# Patient Record
Sex: FEMALE | Race: WHITE | NOT HISPANIC OR LATINO | Employment: FULL TIME | URBAN - METROPOLITAN AREA
[De-identification: names, ages, dates, MRNs, and addresses within clinical notes are randomized per-mention and may not be internally consistent; named-entity substitution may affect disease eponyms.]

---

## 2018-05-07 ENCOUNTER — TELEPHONE (OUTPATIENT)
Dept: FAMILY MEDICINE | Facility: CLINIC | Age: 43
End: 2018-05-07

## 2018-05-07 NOTE — TELEPHONE ENCOUNTER
----- Message from Palmer Ferraro MD sent at 5/4/2018 12:38 PM CDT -----  Check if patient needs  followup office visit after emergency room or for annual physical  ----- Message -----  From: Nora Givens MA  Sent: 5/2/2018  10:27 AM  To: Palmer Ferraro MD        ----- Message -----  From: Bruna Clark  Sent: 5/2/2018  10:13 AM  To: Jasmine Chakraborty Staff    Emergency room, 05/02/2018

## 2019-01-10 ENCOUNTER — TELEPHONE (OUTPATIENT)
Dept: UROLOGY | Facility: CLINIC | Age: 44
End: 2019-01-10

## 2019-02-12 ENCOUNTER — OFFICE VISIT (OUTPATIENT)
Dept: UROLOGY | Facility: CLINIC | Age: 44
End: 2019-02-12
Payer: COMMERCIAL

## 2019-02-12 VITALS
WEIGHT: 177.94 LBS | HEIGHT: 62 IN | SYSTOLIC BLOOD PRESSURE: 121 MMHG | HEART RATE: 83 BPM | DIASTOLIC BLOOD PRESSURE: 82 MMHG | BODY MASS INDEX: 32.74 KG/M2

## 2019-02-12 DIAGNOSIS — N20.0 NEPHROLITHIASIS: ICD-10-CM

## 2019-02-12 DIAGNOSIS — M54.9 BACK PAIN, UNSPECIFIED BACK LOCATION, UNSPECIFIED BACK PAIN LATERALITY, UNSPECIFIED CHRONICITY: ICD-10-CM

## 2019-02-12 DIAGNOSIS — N39.3 SUI (STRESS URINARY INCONTINENCE, FEMALE): Primary | ICD-10-CM

## 2019-02-12 DIAGNOSIS — R35.0 URINARY FREQUENCY: ICD-10-CM

## 2019-02-12 LAB
BILIRUB SERPL-MCNC: ABNORMAL MG/DL
BLOOD URINE, POC: ABNORMAL
COLOR, POC UA: ABNORMAL
GLUCOSE UR QL STRIP: ABNORMAL
KETONES UR QL STRIP: ABNORMAL
LEUKOCYTE ESTERASE URINE, POC: ABNORMAL
NITRITE, POC UA: ABNORMAL
PH, POC UA: 5.5
PROTEIN, POC: ABNORMAL
SPECIFIC GRAVITY, POC UA: 1.02
UROBILINOGEN, POC UA: 0.2

## 2019-02-12 PROCEDURE — 99205 PR OFFICE/OUTPT VISIT, NEW, LEVL V, 60-74 MIN: ICD-10-PCS | Mod: 25,S$GLB,, | Performed by: UROLOGY

## 2019-02-12 PROCEDURE — 99999 PR PBB SHADOW E&M-EST. PATIENT-LVL III: CPT | Mod: PBBFAC,,, | Performed by: UROLOGY

## 2019-02-12 PROCEDURE — 3008F PR BODY MASS INDEX (BMI) DOCUMENTED: ICD-10-PCS | Mod: CPTII,S$GLB,, | Performed by: UROLOGY

## 2019-02-12 PROCEDURE — 81002 POCT URINE DIPSTICK WITHOUT MICROSCOPE: ICD-10-PCS | Mod: S$GLB,,, | Performed by: UROLOGY

## 2019-02-12 PROCEDURE — 99205 OFFICE O/P NEW HI 60 MIN: CPT | Mod: 25,S$GLB,, | Performed by: UROLOGY

## 2019-02-12 PROCEDURE — 3008F BODY MASS INDEX DOCD: CPT | Mod: CPTII,S$GLB,, | Performed by: UROLOGY

## 2019-02-12 PROCEDURE — 81002 URINALYSIS NONAUTO W/O SCOPE: CPT | Mod: S$GLB,,, | Performed by: UROLOGY

## 2019-02-12 PROCEDURE — 51725 SIMPLE CYSTOMETROGRAM: CPT | Mod: S$GLB,,, | Performed by: UROLOGY

## 2019-02-12 PROCEDURE — 51725 PR SIMPLE CYSTOMETROGRAM: ICD-10-PCS | Mod: S$GLB,,, | Performed by: UROLOGY

## 2019-02-12 PROCEDURE — 99999 PR PBB SHADOW E&M-EST. PATIENT-LVL III: ICD-10-PCS | Mod: PBBFAC,,, | Performed by: UROLOGY

## 2019-02-12 RX ORDER — IBUPROFEN 600 MG/1
1 TABLET ORAL
COMMUNITY
Start: 2019-01-20 | End: 2022-04-04

## 2019-02-12 RX ORDER — ESCITALOPRAM OXALATE 20 MG/1
1 TABLET ORAL NIGHTLY
COMMUNITY
Start: 2013-09-23 | End: 2022-04-04 | Stop reason: SDUPTHER

## 2019-02-12 NOTE — LETTER
February 12, 2019      Mohinder Gupta MD  [address] [facility]  [address]  [place] [address]           [place] [facility]  [address] [place] [address] [address]  [place] [address]  Phone: [phone]  Fax: [phone]          Patient: Liat Mensah   MR Number: [id number]   YOB: 1975   Date of Visit: 2/12/2019       Dear Dr. Mohinder Gupta:    Thank you for referring Liat Mensah to me for evaluation. Attached you will find relevant portions of my assessment and plan of care.    If you have questions, please do not hesitate to call me. I look forward to following Liat Mensah along with you.    Sincerely,    Liya Barker MD    Enclosure  CC:  No Recipients    If you would like to receive this communication electronically, please contact [email or web address] or [phone] to request more information on [employer] access.    For providers and/or their staff who would like to refer a patient to [facility], please contact us through our one-stop-shop provider referral line, [facility], at [phone].    If you feel you have received this communication in error or would no longer like to receive these types of communications, please e-mail [email or web address]

## 2019-02-12 NOTE — PATIENT INSTRUCTIONS
Sagrario  -went over risks and benefits. Pt wants to hold off for now. Nervous. Given info today. 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 +stress test with standing only with 150cc. Large volume leakage. Can try kegels 10 in a row 2x a day and keeping bladder empty for now. When bothersome enough return. Discussed other options but at her age would recommend sling. No prolapse seen.    nephrolithiasis  -previously had a left uvj stone, no other stones. Hold off on workup    Back pain  -referral to      She will call for f/u once she decides to proceed

## 2019-02-12 NOTE — PROGRESS NOTES
[facility] Note - [place]  Staff: MD Mj    Referring provider and please cc: Dr.Chequita Gupta  PCP: Dr.Chequita Gupta    [facility]: active - will sign up today     Chief Complaint:     Subjective:        HPI: Liat Mensah is a 43 y.o. female presents with     ETHEL, female  She has primary ETHEL that has been present since the birth of her last child 16 years ago and has been worsening since. Very bothersome to her. Changes pantyliners at least 3x a day and voids 30 minutes to 1 hour to keep bladder empty. Significant leakage ETHEL with sneezing, coughing. Denies any prolapse. . Has tried kegels for a long time without success. Not prone to uti's. 8 pound babies  Does not plan to have any more children. Still has regular menses    Right kidney stone  She has a h/o kidney stone in 2018, went to [facility] for left flank pain. Passed this stone and was told she had another stone. This was her first stone. ctrss showed a 3x2mm left uvj stone with mild hydro.     No blood in urine since    ua void: tr leukocytes  Urine history:  18 Ng, void: 3+ blood (time of stone)    ECOG Status: 0      REVIEW OF SYSTEMS:  General ROS: no fevers, no chills  Psychological ROS: no depression  Endocrine ROS: no heat or cold  Respiratory ROS: no SOB  Cardiovascular ROS: no CP  Gastrointestinal ROS: no abdominal pain, no constipation, no diarrhea, noBRBPR  Musculoskeletal ROS: no muscle pain  Neurological ROS: no headaches  Dermatological ROS: no rashes  HEENT: noglasses, no sinus   ROS: per HPI     PMHx:  Past Medical History:   Diagnosis Date    Back pain     Panic attack    ethel, female  Panic disorder - trauma  Kidney stones: Yes     PSHx:  Past Surgical History:   Procedure Laterality Date    breast augmentation      DILATION AND CURETTAGE OF UTERUS      TUBAL LIGATION     breast reduction  Urologic or Gynecologic Surgery: tubal ligation and d&c     Stents/Valves/Foreign Bodies:  none  Cardiologist: none  Gynecologist: , last pap 3 years ago, normal.     Fam Hx:   malignancies: No , gyn malignancies: No .  Mother  a 63, CHF? Didn't know father  kidney stones: No     Soc Hx:  + tobacco.  1 pk per day x 25 year  No alcohol  Lives in [place]  : here with    Children: 2   Occupation:  at [employer]    Allergies:  Patient has no known allergies.    Home Medications: reviewed   Urologic Medications:   Anticoagulation: Yes - BC powders for chronic back pain, hasn't seen anyone     Objective:     Vitals:    19 1001   BP: 121/82   Pulse: 83       General:WDWN in NAD  Eyes: PERRLA, normal conjunctiva  Respiratory: no increased work on breathing. No wheezing.   Cardiovascular: No obvious extremity edema. Warm and well perfused.   GI: no palpation of masses. No tenderness. No hepatosplenomegaly to palpation.  Musculoskeletal: normal range of motion of bilateral upper extremities. Normal muscle strength and tone.  Skin: no obvious rashes or lesions. No tightening of skin noted.  Neurologic: CN grossly normal. Normal sensation.   Psychiatric: awake, alert and oriented x 3. Mood and affect normal. Cooperative.    Pelvic exam  External Genitalia: normal hair distribution, no lesions  Urethral meatus: normal without prolapse or caruncle  Urethra: without tenderness or mass  Bladder: without fulness or tenderness  Vagina: normal appearing. No discharge or lesions. no prolapse.  Anus and perineum: appear normal  In and out cath performed with 20cc residual  Levator ani tenderness: none    Pelvic exam today:  neg stress test with coughing supine, neg stress test with valsalva supine  In and out cath performed with 20cc residual - urine was not sent for sample  First sensation to void felt at 40 cc  Significant urge felt at 100cc  Bladder filled to 120 cc  Catheter removed  Neg stress test with coughing supine, neg stress test with valsalva supine  Refilled  to 150cc total  Catheter removed  Neg stress test with coughing supine, neg stress test with valsalva supine  +stress test with coughing. Neg  valsalva  Standing    No prolapse noted    LABS REVIEW:      No results found for: WBC, HGB, HCT, MCV, PLT  BMP  No results found for: NA, K, CL, CO2, BUN, CREATININE, CALCIUM, ANIONGAP, ESTGFRAFRICA, EGFRNONAA      PATHOLOGY REVIEW:  none    RADIOGRAPHIC REVIEW:  ctrss 5/2/18 smh  Left uvj stone 3x2mm, no other stones in kidney      Assessment:       1. GERMAN (stress urinary incontinence, female)    2. Back pain, unspecified back location, unspecified back pain laterality, unspecified chronicity    3. Nephrolithiasis    4. Urinary frequency          Plan:     German  -went over risks and benefits. Pt wants to hold off for now. Nervous. Given info today. 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 +stress test with standing only with 150cc. Large volume leakage. Can try kegels 10 in a row 2x a day and keeping bladder empty for now. When bothersome enough return. Discussed other options but at her age would recommend sling. No prolapse seen.    nephrolithiasis  -previously had a left uvj stone, no other stones. Hold off on workup    Back pain  -referral to      She will call for f/u once she decides to proceed     Liya Barker MD

## 2022-01-31 ENCOUNTER — TELEPHONE (OUTPATIENT)
Dept: FAMILY MEDICINE | Facility: CLINIC | Age: 47
End: 2022-01-31
Payer: COMMERCIAL

## 2022-01-31 NOTE — TELEPHONE ENCOUNTER
----- Message from Taylor Rock sent at 1/31/2022 10:10 AM CST -----  Pt is having back issues and needs to be seen. Pt wants an earlier appt than 4/4 if possible or let her know what she can pacheco for her back.  Please advise. Pt #[phone]

## 2022-04-04 ENCOUNTER — OFFICE VISIT (OUTPATIENT)
Dept: FAMILY MEDICINE | Facility: CLINIC | Age: 47
End: 2022-04-04
Payer: COMMERCIAL

## 2022-04-04 VITALS
WEIGHT: 182 LBS | HEIGHT: 62 IN | HEART RATE: 75 BPM | SYSTOLIC BLOOD PRESSURE: 130 MMHG | BODY MASS INDEX: 33.49 KG/M2 | OXYGEN SATURATION: 99 % | DIASTOLIC BLOOD PRESSURE: 84 MMHG

## 2022-04-04 DIAGNOSIS — N20.0 KIDNEY STONES: ICD-10-CM

## 2022-04-04 DIAGNOSIS — M54.30 SCIATICA, UNSPECIFIED LATERALITY: ICD-10-CM

## 2022-04-04 DIAGNOSIS — Z12.11 COLON CANCER SCREENING: ICD-10-CM

## 2022-04-04 DIAGNOSIS — F41.1 GAD (GENERALIZED ANXIETY DISORDER): Primary | ICD-10-CM

## 2022-04-04 DIAGNOSIS — Z12.31 ENCOUNTER FOR SCREENING MAMMOGRAM FOR MALIGNANT NEOPLASM OF BREAST: ICD-10-CM

## 2022-04-04 DIAGNOSIS — M15.9 PRIMARY OSTEOARTHRITIS INVOLVING MULTIPLE JOINTS: ICD-10-CM

## 2022-04-04 PROBLEM — F34.1 DYSTHYMIA: Status: ACTIVE | Noted: 2022-04-04

## 2022-04-04 PROCEDURE — 3008F PR BODY MASS INDEX (BMI) DOCUMENTED: ICD-10-PCS | Mod: S$GLB,,, | Performed by: FAMILY MEDICINE

## 2022-04-04 PROCEDURE — 99214 PR OFFICE/OUTPT VISIT, EST, LEVL IV, 30-39 MIN: ICD-10-PCS | Mod: S$GLB,,, | Performed by: FAMILY MEDICINE

## 2022-04-04 PROCEDURE — 99214 OFFICE O/P EST MOD 30 MIN: CPT | Mod: S$GLB,,, | Performed by: FAMILY MEDICINE

## 2022-04-04 PROCEDURE — 3008F BODY MASS INDEX DOCD: CPT | Mod: S$GLB,,, | Performed by: FAMILY MEDICINE

## 2022-04-04 PROCEDURE — 1160F PR REVIEW ALL MEDS BY PRESCRIBER/CLIN PHARMACIST DOCUMENTED: ICD-10-PCS | Mod: S$GLB,,, | Performed by: FAMILY MEDICINE

## 2022-04-04 PROCEDURE — 1160F RVW MEDS BY RX/DR IN RCRD: CPT | Mod: S$GLB,,, | Performed by: FAMILY MEDICINE

## 2022-04-04 RX ORDER — ESCITALOPRAM OXALATE 20 MG/1
20 TABLET ORAL NIGHTLY
Qty: 90 TABLET | Refills: 1 | Status: SHIPPED | OUTPATIENT
Start: 2022-04-04 | End: 2022-10-26

## 2022-04-04 RX ORDER — CYCLOBENZAPRINE HCL 5 MG
TABLET ORAL
COMMUNITY
Start: 2022-02-04 | End: 2022-04-04 | Stop reason: SDUPTHER

## 2022-04-04 RX ORDER — CYCLOBENZAPRINE HCL 5 MG
5 TABLET ORAL NIGHTLY
Qty: 90 TABLET | Refills: 1 | Status: SHIPPED | OUTPATIENT
Start: 2022-04-04 | End: 2023-05-08 | Stop reason: SDUPTHER

## 2022-04-04 NOTE — PROGRESS NOTES
SUBJECTIVE:    Patient ID: Liat Mensah is a 46 y.o. female.    Chief Complaint: Regular Check Up, review outside labs, mammogram order, and cologuard order    Patient back pain has been seen by pain management and lumbar xray and MRI noted. She was unable to get MRI due to anxiety. She was given mobic and muscle relaxer with some relief. Declines injections at this time so she did not return . Has transportation issues   She has anxiety disorder and has been managed by psych. Has done well on lexapro  Continues to smoke is down to 1/2 ppd . Has been weaning off .   Has h/o kidney stones and has pain in her right side that she may have one again   Patient reports heavy menses and concentration deficits  Over the past 2 years  Full recent lab panel from Dr. Samayoa 11/21 were visualized and are normal       Past Medical History:   Diagnosis Date    Back pain     Panic attack      Past Surgical History:   Procedure Laterality Date    breast augmentation      DILATION AND CURETTAGE OF UTERUS      TUBAL LIGATION       History reviewed. No pertinent family history.    Marital Status:   Alcohol History:  reports no history of alcohol use.  Tobacco History:  reports that she has been smoking cigarettes. She has been smoking about 1.00 pack per day. She has never used smokeless tobacco.  Drug History:  reports no history of drug use.    Review of patient's allergies indicates:  No Known Allergies    Current Outpatient Medications:     aspirin-caffeine 1,000-65 mg PwPk, Take by mouth 2 (two) times daily as needed., Disp: , Rfl:     cyclobenzaprine (FLEXERIL) 5 MG tablet, Take 1 tablet (5 mg total) by mouth nightly., Disp: 90 tablet, Rfl: 1    EScitalopram oxalate (LEXAPRO) 20 MG tablet, Take 1 tablet (20 mg total) by mouth nightly., Disp: 90 tablet, Rfl: 1    Review of Systems   Constitutional: Negative for activity change, fatigue and unexpected weight change.   HENT: Negative for hearing loss,  "postnasal drip, sinus pressure, sore throat and voice change.    Eyes: Negative for photophobia and visual disturbance.   Respiratory: Negative for cough, shortness of breath and wheezing.    Cardiovascular: Negative for chest pain and palpitations.   Gastrointestinal: Negative for constipation, diarrhea and nausea.   Genitourinary: Negative for difficulty urinating, frequency, hematuria and urgency.   Musculoskeletal: Positive for back pain. Negative for arthralgias.   Skin: Negative for rash.   Neurological: Negative for weakness, light-headedness and headaches.   Hematological: Negative for adenopathy. Does not bruise/bleed easily.   Psychiatric/Behavioral: The patient is nervous/anxious.           Objective:      Vitals:    04/04/22 1539   BP: 130/84   Pulse: 75   SpO2: 99%   Weight: 82.6 kg (182 lb)   Height: 5' 2" (1.575 m)     Physical Exam  Constitutional:       Appearance: Normal appearance.   HENT:      Head: Normocephalic and atraumatic.      Mouth/Throat:      Mouth: Mucous membranes are moist.   Eyes:      Conjunctiva/sclera: Conjunctivae normal.   Cardiovascular:      Rate and Rhythm: Normal rate and regular rhythm.   Pulmonary:      Effort: Pulmonary effort is normal.   Musculoskeletal:         General: No swelling.   Neurological:      General: No focal deficit present.      Mental Status: She is alert and oriented to person, place, and time.   Psychiatric:         Mood and Affect: Mood normal.         Behavior: Behavior is hyperactive.           Assessment:       1. JOHNY (generalized anxiety disorder)    2. Primary osteoarthritis involving multiple joints    3. Sciatica, unspecified laterality    4. Kidney stones    5. Encounter for screening mammogram for malignant neoplasm of breast    6. Colon cancer screening         Plan:       JOHNY (generalized anxiety disorder)  -     EScitalopram oxalate (LEXAPRO) 20 MG tablet; Take 1 tablet (20 mg total) by mouth nightly.  Dispense: 90 tablet; Refill: " 1    Primary osteoarthritis involving multiple joints  Continue p.r.n. ibuprofen    Sciatica, unspecified laterality  -     cyclobenzaprine (FLEXERIL) 5 MG tablet; Take 1 tablet (5 mg total) by mouth nightly.  Dispense: 90 tablet; Refill: 1    Kidney stones    Encounter for screening mammogram for malignant neoplasm of breast  -     Mammo Digital Screening Bilat; Future; Expected date: 04/04/2022    Colon cancer screening  -     Cologuard Screening (Multitarget Stool DNA); Future; Expected date: 04/04/2022      Follow up in about 6 months (around 10/4/2022) for anxiety.

## 2022-04-21 ENCOUNTER — HOSPITAL ENCOUNTER (OUTPATIENT)
Dept: RADIOLOGY | Facility: HOSPITAL | Age: 47
Discharge: HOME | End: 2022-04-21
Attending: FAMILY MEDICINE
Payer: COMMERCIAL

## 2022-04-21 DIAGNOSIS — Z12.31 ENCOUNTER FOR SCREENING MAMMOGRAM FOR MALIGNANT NEOPLASM OF BREAST: ICD-10-CM

## 2022-04-21 PROCEDURE — 77067 SCR MAMMO BI INCL CAD: CPT | Mod: TC,PO

## 2022-04-21 PROCEDURE — 77063 BREAST TOMOSYNTHESIS BI: CPT | Mod: TC,PO

## 2022-04-27 LAB — NONINV COLON CA DNA+OCC BLD SCRN STL QL: NEGATIVE

## 2022-04-28 ENCOUNTER — TELEPHONE (OUTPATIENT)
Dept: FAMILY MEDICINE | Facility: CLINIC | Age: 47
End: 2022-04-28

## 2022-05-02 DIAGNOSIS — M54.50 LOW BACK PAIN: Primary | ICD-10-CM

## 2022-05-17 ENCOUNTER — HOSPITAL ENCOUNTER (OUTPATIENT)
Dept: RADIOLOGY | Facility: HOSPITAL | Age: 47
Discharge: HOME | End: 2022-05-17
Attending: EMERGENCY MEDICINE
Payer: COMMERCIAL

## 2022-05-17 DIAGNOSIS — M54.50 LOW BACK PAIN: ICD-10-CM

## 2022-05-17 PROCEDURE — 72148 MRI LUMBAR SPINE W/O DYE: CPT | Mod: TC,PO

## 2022-05-22 ENCOUNTER — HOSPITAL ENCOUNTER (EMERGENCY)
Facility: HOSPITAL | Age: 47
Discharge: HOME | End: 2022-05-22
Attending: EMERGENCY MEDICINE
Payer: COMMERCIAL

## 2022-05-22 VITALS
OXYGEN SATURATION: 96 % | TEMPERATURE: 101 F | BODY MASS INDEX: 30.73 KG/M2 | HEART RATE: 114 BPM | SYSTOLIC BLOOD PRESSURE: 147 MMHG | RESPIRATION RATE: 18 BRPM | HEIGHT: 64 IN | WEIGHT: 180 LBS | DIASTOLIC BLOOD PRESSURE: 83 MMHG

## 2022-05-22 DIAGNOSIS — R00.0 TACHYCARDIA: ICD-10-CM

## 2022-05-22 DIAGNOSIS — R50.9 FEVER: ICD-10-CM

## 2022-05-22 DIAGNOSIS — U07.1 COVID-19 VIRUS INFECTION: Primary | ICD-10-CM

## 2022-05-22 LAB
ALBUMIN SERPL BCP-MCNC: 4 G/DL (ref 3.5–5.2)
ALP SERPL-CCNC: 63 U/L (ref 55–135)
ALT SERPL W/O P-5'-P-CCNC: 15 U/L (ref 10–44)
ANION GAP SERPL CALC-SCNC: 8 MMOL/L (ref 8–16)
AST SERPL-CCNC: 17 U/L (ref 10–40)
B-HCG UR QL: NEGATIVE
BACTERIA #/AREA URNS HPF: NEGATIVE /HPF
BASOPHILS # BLD AUTO: 0.03 K/UL (ref 0–0.2)
BASOPHILS NFR BLD: 0.4 % (ref 0–1.9)
BILIRUB SERPL-MCNC: 0.5 MG/DL (ref 0.1–1)
BILIRUB UR QL STRIP: NEGATIVE
BUN SERPL-MCNC: 13 MG/DL (ref 6–20)
CALCIUM SERPL-MCNC: 8.8 MG/DL (ref 8.7–10.5)
CHLORIDE SERPL-SCNC: 105 MMOL/L (ref 95–110)
CLARITY UR: CLEAR
CO2 SERPL-SCNC: 22 MMOL/L (ref 23–29)
COLOR UR: COLORLESS
CREAT SERPL-MCNC: 0.7 MG/DL (ref 0.5–1.4)
CTP QC/QA: YES
D DIMER PPP IA.FEU-MCNC: 0.48 UG/ML FEU
DIFFERENTIAL METHOD: ABNORMAL
EOSINOPHIL # BLD AUTO: 0.1 K/UL (ref 0–0.5)
EOSINOPHIL NFR BLD: 1.1 % (ref 0–8)
ERYTHROCYTE [DISTWIDTH] IN BLOOD BY AUTOMATED COUNT: 13.6 % (ref 11.5–14.5)
EST. GFR  (AFRICAN AMERICAN): >60 ML/MIN/1.73 M^2
EST. GFR  (NON AFRICAN AMERICAN): >60 ML/MIN/1.73 M^2
GLUCOSE SERPL-MCNC: 112 MG/DL (ref 70–110)
GLUCOSE UR QL STRIP: NEGATIVE
HCG INTACT+B SERPL-ACNC: <0.6 MIU/ML
HCT VFR BLD AUTO: 38.7 % (ref 37–48.5)
HGB BLD-MCNC: 12.8 G/DL (ref 12–16)
HGB UR QL STRIP: NEGATIVE
HYALINE CASTS #/AREA URNS LPF: 4 /LPF
IMM GRANULOCYTES # BLD AUTO: 0.03 K/UL (ref 0–0.04)
IMM GRANULOCYTES NFR BLD AUTO: 0.4 % (ref 0–0.5)
INFLUENZA A, MOLECULAR: NEGATIVE
INFLUENZA B, MOLECULAR: NEGATIVE
KETONES UR QL STRIP: NEGATIVE
LEUKOCYTE ESTERASE UR QL STRIP: ABNORMAL
LYMPHOCYTES # BLD AUTO: 0.4 K/UL (ref 1–4.8)
LYMPHOCYTES NFR BLD: 5.6 % (ref 18–48)
MCH RBC QN AUTO: 28.8 PG (ref 27–31)
MCHC RBC AUTO-ENTMCNC: 33.1 G/DL (ref 32–36)
MCV RBC AUTO: 87 FL (ref 82–98)
MICROSCOPIC COMMENT: ABNORMAL
MONOCYTES # BLD AUTO: 0.6 K/UL (ref 0.3–1)
MONOCYTES NFR BLD: 7.8 % (ref 4–15)
NEUTROPHILS # BLD AUTO: 6 K/UL (ref 1.8–7.7)
NEUTROPHILS NFR BLD: 84.7 % (ref 38–73)
NITRITE UR QL STRIP: NEGATIVE
NRBC BLD-RTO: 0 /100 WBC
PH UR STRIP: 6 [PH] (ref 5–8)
PLATELET # BLD AUTO: 190 K/UL (ref 150–450)
PMV BLD AUTO: 10 FL (ref 9.2–12.9)
POTASSIUM SERPL-SCNC: 3.6 MMOL/L (ref 3.5–5.1)
PROT SERPL-MCNC: 7.1 G/DL (ref 6–8.4)
PROT UR QL STRIP: NEGATIVE
RBC # BLD AUTO: 4.45 M/UL (ref 4–5.4)
RBC #/AREA URNS HPF: 1 /HPF (ref 0–4)
SARS-COV-2 RDRP RESP QL NAA+PROBE: POSITIVE
SODIUM SERPL-SCNC: 135 MMOL/L (ref 136–145)
SP GR UR STRIP: 1 (ref 1–1.03)
SPECIMEN SOURCE: NORMAL
SQUAMOUS #/AREA URNS HPF: 4 /HPF
TSH SERPL DL<=0.005 MIU/L-ACNC: 0.84 UIU/ML (ref 0.34–5.6)
URN SPEC COLLECT METH UR: ABNORMAL
UROBILINOGEN UR STRIP-ACNC: NEGATIVE EU/DL
WBC # BLD AUTO: 7.09 K/UL (ref 3.9–12.7)
WBC #/AREA URNS HPF: 7 /HPF (ref 0–5)

## 2022-05-22 PROCEDURE — 63600175 PHARM REV CODE 636 W HCPCS: Performed by: STUDENT IN AN ORGANIZED HEALTH CARE EDUCATION/TRAINING PROGRAM

## 2022-05-22 PROCEDURE — 84702 CHORIONIC GONADOTROPIN TEST: CPT | Performed by: STUDENT IN AN ORGANIZED HEALTH CARE EDUCATION/TRAINING PROGRAM

## 2022-05-22 PROCEDURE — 87502 INFLUENZA DNA AMP PROBE: CPT | Performed by: STUDENT IN AN ORGANIZED HEALTH CARE EDUCATION/TRAINING PROGRAM

## 2022-05-22 PROCEDURE — 96374 THER/PROPH/DIAG INJ IV PUSH: CPT

## 2022-05-22 PROCEDURE — 80053 COMPREHEN METABOLIC PANEL: CPT | Performed by: STUDENT IN AN ORGANIZED HEALTH CARE EDUCATION/TRAINING PROGRAM

## 2022-05-22 PROCEDURE — 96375 TX/PRO/DX INJ NEW DRUG ADDON: CPT

## 2022-05-22 PROCEDURE — U0002 COVID-19 LAB TEST NON-CDC: HCPCS | Performed by: STUDENT IN AN ORGANIZED HEALTH CARE EDUCATION/TRAINING PROGRAM

## 2022-05-22 PROCEDURE — 85379 FIBRIN DEGRADATION QUANT: CPT | Performed by: STUDENT IN AN ORGANIZED HEALTH CARE EDUCATION/TRAINING PROGRAM

## 2022-05-22 PROCEDURE — 96361 HYDRATE IV INFUSION ADD-ON: CPT

## 2022-05-22 PROCEDURE — 81001 URINALYSIS AUTO W/SCOPE: CPT | Performed by: STUDENT IN AN ORGANIZED HEALTH CARE EDUCATION/TRAINING PROGRAM

## 2022-05-22 PROCEDURE — 93005 ELECTROCARDIOGRAM TRACING: CPT | Performed by: INTERNAL MEDICINE

## 2022-05-22 PROCEDURE — 85025 COMPLETE CBC W/AUTO DIFF WBC: CPT | Performed by: STUDENT IN AN ORGANIZED HEALTH CARE EDUCATION/TRAINING PROGRAM

## 2022-05-22 PROCEDURE — 81025 URINE PREGNANCY TEST: CPT | Performed by: EMERGENCY MEDICINE

## 2022-05-22 PROCEDURE — 93010 EKG 12-LEAD: ICD-10-PCS | Mod: ,,, | Performed by: INTERNAL MEDICINE

## 2022-05-22 PROCEDURE — 93010 ELECTROCARDIOGRAM REPORT: CPT | Mod: ,,, | Performed by: INTERNAL MEDICINE

## 2022-05-22 PROCEDURE — 25000003 PHARM REV CODE 250: Performed by: STUDENT IN AN ORGANIZED HEALTH CARE EDUCATION/TRAINING PROGRAM

## 2022-05-22 PROCEDURE — 99284 EMERGENCY DEPT VISIT MOD MDM: CPT | Mod: 25

## 2022-05-22 PROCEDURE — 84443 ASSAY THYROID STIM HORMONE: CPT | Performed by: STUDENT IN AN ORGANIZED HEALTH CARE EDUCATION/TRAINING PROGRAM

## 2022-05-22 RX ORDER — PROCHLORPERAZINE EDISYLATE 5 MG/ML
10 INJECTION INTRAMUSCULAR; INTRAVENOUS
Status: COMPLETED | OUTPATIENT
Start: 2022-05-22 | End: 2022-05-22

## 2022-05-22 RX ORDER — DIPHENHYDRAMINE HYDROCHLORIDE 50 MG/ML
12.5 INJECTION INTRAMUSCULAR; INTRAVENOUS
Status: COMPLETED | OUTPATIENT
Start: 2022-05-22 | End: 2022-05-22

## 2022-05-22 RX ORDER — ACETAMINOPHEN 500 MG
1000 TABLET ORAL
Status: COMPLETED | OUTPATIENT
Start: 2022-05-22 | End: 2022-05-22

## 2022-05-22 RX ADMIN — SODIUM CHLORIDE 1000 ML: 0.9 INJECTION, SOLUTION INTRAVENOUS at 04:05

## 2022-05-22 RX ADMIN — PROCHLORPERAZINE EDISYLATE 10 MG: 5 INJECTION INTRAMUSCULAR; INTRAVENOUS at 04:05

## 2022-05-22 RX ADMIN — DIPHENHYDRAMINE HYDROCHLORIDE 12.5 MG: 50 INJECTION INTRAMUSCULAR; INTRAVENOUS at 04:05

## 2022-05-22 RX ADMIN — ACETAMINOPHEN 1000 MG: 500 TABLET, FILM COATED ORAL at 04:05

## 2022-05-22 NOTE — ED NOTES
PT C/O LOWER BACK PAIN RADIATING DOWN BOTH LEGS AND HEADACHE/JAW PAIN. NO ACUTE DISTRESS NOTED. STABLE CONDITION. FAMILY MEMBER AT BEDSIDE.

## 2022-05-22 NOTE — ED PROVIDER NOTES
Encounter Date: 5/22/2022       History     Chief Complaint   Patient presents with    Jaw Pain     Headache ache, lower back pain and legs pain     47-year-old female presents to the ER for lower back pain and bilateral leg pain as well as headache and jaw pain.  Patient states she was in her usual state of health till Saturday afternoon.  She had woken from a nap around 3:00 p.m. and had chills she stated that she could not get warm she had to take several warm baths.  She then developed bilateral jaw pain wrapping around to her temples and into her frontal sinus.  Her pain did not improve with BC powders and her nightly muscle relaxer so she was brought to the ER for evaluation.  She denies any urinary complaints vaginal discharge cough shortness of breath or abdominal pain.        Review of patient's allergies indicates:  No Known Allergies  Past Medical History:   Diagnosis Date    Back pain     Panic attack      Past Surgical History:   Procedure Laterality Date    breast augmentation      DILATION AND CURETTAGE OF UTERUS      TUBAL LIGATION       No family history on file.  Social History     Tobacco Use    Smoking status: Current Every Day Smoker     Packs/day: 1.00     Types: Cigarettes    Smokeless tobacco: Never Used   Substance Use Topics    Alcohol use: No    Drug use: No     Review of Systems   Constitutional: Positive for chills and fever.   HENT: Positive for sinus pain. Negative for mouth sores, sore throat and trouble swallowing.    Eyes: Negative for redness.   Respiratory: Negative for cough and shortness of breath.    Cardiovascular: Negative for chest pain and palpitations.   Gastrointestinal: Negative for abdominal pain, nausea and vomiting.   Endocrine: Negative for polyuria.   Genitourinary: Negative for dysuria, flank pain, frequency and vaginal discharge.   Musculoskeletal: Positive for back pain. Negative for gait problem and neck pain.   Skin: Negative for rash.   Neurological:  Positive for headaches. Negative for numbness.       Physical Exam     Initial Vitals [05/22/22 0325]   BP Pulse Resp Temp SpO2   (!) 170/93 (!) 120 18 (!) 100.8 °F (38.2 °C) 100 %      MAP       --         Physical Exam    Nursing note and vitals reviewed.  Constitutional: She appears well-developed and well-nourished.   HENT:   Head: Normocephalic and atraumatic.   Mouth/Throat: Oropharynx is clear and moist.   Eyes: EOM are normal. Pupils are equal, round, and reactive to light. Right eye exhibits no discharge. Left eye exhibits no discharge.   Neck: Neck supple. No tracheal deviation present. No JVD present.   Normal range of motion.  Cardiovascular: Normal rate, regular rhythm, normal heart sounds and intact distal pulses.   Pulmonary/Chest: Breath sounds normal. No respiratory distress. She has no wheezes.   Abdominal: Abdomen is soft. Bowel sounds are normal. She exhibits no distension. There is no abdominal tenderness.   Musculoskeletal:         General: No edema. Normal range of motion.      Cervical back: Normal range of motion and neck supple.      Lumbar back: Tenderness present. No bony tenderness.      Comments: Tenderness to palpation to bilateral lumbar paraspinal.     Neurological: She is alert and oriented to person, place, and time. No cranial nerve deficit or sensory deficit. GCS score is 15. GCS eye subscore is 4. GCS verbal subscore is 5. GCS motor subscore is 6.   Skin: Skin is warm.              ED Course   Procedures  Labs Reviewed   CBC W/ AUTO DIFFERENTIAL - Abnormal; Notable for the following components:       Result Value    Lymph # 0.4 (*)     Gran % 84.7 (*)     Lymph % 5.6 (*)     All other components within normal limits    Narrative:     Release to patient->Immediate   COMPREHENSIVE METABOLIC PANEL - Abnormal; Notable for the following components:    Sodium 135 (*)     CO2 22 (*)     Glucose 112 (*)     All other components within normal limits    Narrative:     Release to  patient->Immediate   URINALYSIS, REFLEX TO URINE CULTURE - Abnormal; Notable for the following components:    Color, UA Colorless (*)     Leukocytes, UA 1+ (*)     All other components within normal limits    Narrative:     Specimen Source->Urine   SARS-COV-2 RNA AMPLIFICATION, QUAL - Abnormal; Notable for the following components:    SARS-CoV-2 RNA, Amplification, Qual Positive (*)     All other components within normal limits    Narrative:     SCOV[facility]  result(s) called and verbal readback obtained from [facility]  by FF1 05/22/2022 05:23   URINALYSIS MICROSCOPIC - Abnormal; Notable for the following components:    WBC, UA 7 (*)     Hyaline Casts, UA 4 (*)     All other components within normal limits    Narrative:     Specimen Source->Urine   HCG, QUANTITATIVE    Narrative:     Release to patient->Immediate   INFLUENZA A AND B ANTIGEN    Narrative:     Specimen Source->Nasopharyngeal Swab   TSH    Narrative:     Release to patient->Immediate   D DIMER, QUANTITATIVE    Narrative:     Release to patient->Immediate   POCT URINE PREGNANCY        ECG Results          EKG 12-lead (In process)  Result time 05/22/22 05:36:16    In process by Interface, Lab In [facility] (05/22/22 05:36:16)                 Narrative:    Test Reason : R00.0,    Vent. Rate : 113 BPM     Atrial Rate : 113 BPM     P-R Int : 144 ms          QRS Dur : 090 ms      QT Int : 342 ms       P-R-T Axes : 020 042 018 degrees     QTc Int : 469 ms    Sinus tachycardia  Nonspecific T wave abnormality  Abnormal ECG  No previous ECGs available    Referred By: [email or web address]   SELF           Confirmed By:                             Imaging Results          X-Ray Chest PA And Lateral (In process)                  Medications   sodium chloride 0.9% bolus 1,000 mL (0 mLs Intravenous Stopped 5/22/22 0140)   prochlorperazine injection Soln 10 mg (10 mg Intravenous Given 5/22/22 3886)   acetaminophen tablet 1,000 mg (1,000 mg Oral Given 5/22/22 1752)  "  diphenhydrAMINE injection 12.5 mg (12.5 mg Intravenous Given 5/22/22 3650)     Medical Decision Making:   Initial Assessment:   47-year-old female presents to the ER for fevers and chills of unknown origin.  She also has acute on chronic low back pain.  She had an MRI done this month which showed disc and facet disease as well as facet narrowing at L5-S1.  Did not show any evidence cord impingement.  Her abdomen is soft she does have bilateral lower back tenderness to palpation.  No saddle anesthesia no numbness no weakness.  Shows no CVA tenderness.  Cardiovascular exam is unremarkable neurological exam is unremarkable HEENT exam is normal.  ED Management:  PGY4 MDM:   47-year-old female presenting the ER for fevers and chills.  She also was complaining of chronic low back pain.  She had no midline tenderness no saddle anesthesia no numbness no weakness to her lower extremities.  No history of any IV drug use or steroid use.  Also no history of any instrumentation.  She also got an MRI done this month which did not show any evidence of any cord compression.  Given her lack of any trauma or risk factors for CNS infection she is below the threshold for any imaging of her back at this time.  She has no abdominal pain on my examination.  Her chest x-ray is normal urinalysis is non infectious nor does she have any complaints of any dysuria, urinary frequency.  Will not empirically and treated for UTI.  She is flu negative but COVID-19 positive.  Her D-dimer is negative low suspicion for any venous thromboembolism.  He was given discharge instructions as well as return precautions.  She is instructed to maintain hydration alternate Tylenol Motrin for fever.  She verbalized understanding.  She is stable for discharge at this time     Angel De La Torre MD PGY4  "5/22/2022" 0537                Attending Attestation:   Physician Attestation Statement for Resident:  As the supervising MD   Physician Attestation Statement: I " have personally seen and examined this patient.   I agree with the above history. -:   As the supervising MD I agree with the above PE.    As the supervising MD I agree with the above treatment, course, plan, and disposition.                         Clinical Impression:   Final diagnoses:  [R50.9] Fever  [R00.0] Tachycardia  [U07.1] COVID-19 virus infection (Primary)          ED Disposition Condition    Discharge Stable        ED Prescriptions     None        Follow-up Information     Follow up With Specialties Details Why Contact Info    Mohinder Gupta MD Family Medicine Call in 1 day For follow-up [address]  [address]  [facility] [address]  [phone]             Angel De La Torre MD  Resident  05/22/22 0537       Ivan Greene MD  05/22/22 0557

## 2022-10-26 ENCOUNTER — OFFICE VISIT (OUTPATIENT)
Dept: FAMILY MEDICINE | Facility: CLINIC | Age: 47
End: 2022-10-26
Payer: COMMERCIAL

## 2022-10-26 VITALS
SYSTOLIC BLOOD PRESSURE: 120 MMHG | DIASTOLIC BLOOD PRESSURE: 74 MMHG | HEIGHT: 64 IN | HEART RATE: 87 BPM | WEIGHT: 180.81 LBS | OXYGEN SATURATION: 98 % | BODY MASS INDEX: 30.87 KG/M2

## 2022-10-26 DIAGNOSIS — Z71.3 NUTRITIONAL COUNSELING: ICD-10-CM

## 2022-10-26 DIAGNOSIS — G47.9 SLEEP DISORDER: ICD-10-CM

## 2022-10-26 DIAGNOSIS — R35.1 NOCTURIA: ICD-10-CM

## 2022-10-26 DIAGNOSIS — F41.1 GAD (GENERALIZED ANXIETY DISORDER): Primary | ICD-10-CM

## 2022-10-26 DIAGNOSIS — R68.82 DECREASED LIBIDO: ICD-10-CM

## 2022-10-26 PROCEDURE — 3074F SYST BP LT 130 MM HG: CPT | Mod: CPTII,S$GLB,, | Performed by: FAMILY MEDICINE

## 2022-10-26 PROCEDURE — 3074F PR MOST RECENT SYSTOLIC BLOOD PRESSURE < 130 MM HG: ICD-10-PCS | Mod: CPTII,S$GLB,, | Performed by: FAMILY MEDICINE

## 2022-10-26 PROCEDURE — 1159F PR MEDICATION LIST DOCUMENTED IN MEDICAL RECORD: ICD-10-PCS | Mod: CPTII,S$GLB,, | Performed by: FAMILY MEDICINE

## 2022-10-26 PROCEDURE — 99214 OFFICE O/P EST MOD 30 MIN: CPT | Mod: S$GLB,,, | Performed by: FAMILY MEDICINE

## 2022-10-26 PROCEDURE — 3078F PR MOST RECENT DIASTOLIC BLOOD PRESSURE < 80 MM HG: ICD-10-PCS | Mod: CPTII,S$GLB,, | Performed by: FAMILY MEDICINE

## 2022-10-26 PROCEDURE — 3078F DIAST BP <80 MM HG: CPT | Mod: CPTII,S$GLB,, | Performed by: FAMILY MEDICINE

## 2022-10-26 PROCEDURE — 99214 PR OFFICE/OUTPT VISIT, EST, LEVL IV, 30-39 MIN: ICD-10-PCS | Mod: S$GLB,,, | Performed by: FAMILY MEDICINE

## 2022-10-26 PROCEDURE — 1159F MED LIST DOCD IN RCRD: CPT | Mod: CPTII,S$GLB,, | Performed by: FAMILY MEDICINE

## 2022-10-26 RX ORDER — ESCITALOPRAM OXALATE 10 MG/1
10 TABLET ORAL DAILY
Qty: 90 TABLET | Refills: 1 | Status: SHIPPED | OUTPATIENT
Start: 2022-10-26 | End: 2023-05-08 | Stop reason: SDUPTHER

## 2022-10-26 NOTE — PROGRESS NOTES
SUBJECTIVE:    Patient ID: Liat Mensah is a 47 y.o. female.    Chief Complaint: Follow-up (No bottles/ meds verbally confirmed/frequent forgetfulness/menopause concerns/decrease sex drive//dp)    Patient with this anemia and generalized anxiety disorder presents for visit.  She has some concerns about her menstrual cycle and possible menopausal symptoms.  Her cycles are still regular each month.  She does report however they are shorter.  Has concerns about menopausal symptoms secondary to fatigue and lack of concentration.  Her she reports making mistakes at work secondary to this.  This has been present for the past few months.  She also reports decreased sex drive.  She denies any urinary symptoms and reports no vaginal dryness.    The patient has a long history depression anxiety symptoms.  States that her previous psychiatrist had her on multiple medications.  She was on lithium at 1 time for proposed bipolar disorder.  She reports that she feels this medication is the primary cause of some chemical imbalance.  She is noted to be on lexapro and has been on this medication for some years      Admission on 05/22/2022, Discharged on 05/22/2022   Component Date Value Ref Range Status    WBC 05/22/2022 7.09  3.90 - 12.70 K/uL Final    RBC 05/22/2022 4.45  4.00 - 5.40 M/uL Final    Hemoglobin 05/22/2022 12.8  12.0 - 16.0 g/dL Final    Hematocrit 05/22/2022 38.7  37.0 - 48.5 % Final    MCV 05/22/2022 87  82 - 98 fL Final    MCH 05/22/2022 28.8  27.0 - 31.0 pg Final    MCHC 05/22/2022 33.1  32.0 - 36.0 g/dL Final    RDW 05/22/2022 13.6  11.5 - 14.5 % Final    Platelets 05/22/2022 190  150 - 450 K/uL Final    MPV 05/22/2022 10.0  9.2 - 12.9 fL Final    Immature Granulocytes 05/22/2022 0.4  0.0 - 0.5 % Final    Gran # (ANC) 05/22/2022 6.0  1.8 - 7.7 K/uL Final    Immature Grans (Abs) 05/22/2022 0.03  0.00 - 0.04 K/uL Final    Lymph # 05/22/2022 0.4 (L)  1.0 - 4.8 K/uL Final    Mono # 05/22/2022 0.6  0.3 - 1.0  K/uL Final    Eos # 05/22/2022 0.1  0.0 - 0.5 K/uL Final    Baso # 05/22/2022 0.03  0.00 - 0.20 K/uL Final    nRBC 05/22/2022 0  0 /100 WBC Final    Gran % 05/22/2022 84.7 (H)  38.0 - 73.0 % Final    Lymph % 05/22/2022 5.6 (L)  18.0 - 48.0 % Final    Mono % 05/22/2022 7.8  4.0 - 15.0 % Final    Eosinophil % 05/22/2022 1.1  0.0 - 8.0 % Final    Basophil % 05/22/2022 0.4  0.0 - 1.9 % Final    Differential Method 05/22/2022 Automated   Final    Sodium 05/22/2022 135 (L)  136 - 145 mmol/L Final    Potassium 05/22/2022 3.6  3.5 - 5.1 mmol/L Final    Chloride 05/22/2022 105  95 - 110 mmol/L Final    CO2 05/22/2022 22 (L)  23 - 29 mmol/L Final    Glucose 05/22/2022 112 (H)  70 - 110 mg/dL Final    BUN 05/22/2022 13  6 - 20 mg/dL Final    Creatinine 05/22/2022 0.7  0.5 - 1.4 mg/dL Final    Calcium 05/22/2022 8.8  8.7 - 10.5 mg/dL Final    Total Protein 05/22/2022 7.1  6.0 - 8.4 g/dL Final    Albumin 05/22/2022 4.0  3.5 - 5.2 g/dL Final    Total Bilirubin 05/22/2022 0.5  0.1 - 1.0 mg/dL Final    Alkaline Phosphatase 05/22/2022 63  55 - 135 U/L Final    AST 05/22/2022 17  10 - 40 U/L Final    ALT 05/22/2022 15  10 - 44 U/L Final    Anion Gap 05/22/2022 8  8 - 16 mmol/L Final    eGFR if African American 05/22/2022 >60.0  >60 mL/min/1.73 m^2 Final    eGFR if non African American 05/22/2022 >60.0  >60 mL/min/1.73 m^2 Final    HCG Quant 05/22/2022 <0.60  See Text mIU/mL Final    Influenza A, Molecular 05/22/2022 Negative  Negative Final    Influenza B, Molecular 05/22/2022 Negative  Negative Final    Flu A & B Source 05/22/2022 Nasal swab   Final    TSH 05/22/2022 0.840  0.340 - 5.600 uIU/mL Final    Specimen UA 05/22/2022 Urine, Clean Catch   Final    Color, UA 05/22/2022 Colorless (A)  Yellow, Straw, Sheryl Final    Appearance, UA 05/22/2022 Clear  Clear Final    pH, UA 05/22/2022 6.0  5.0 - 8.0 Final    Specific Gravity, UA 05/22/2022 1.005  1.005 - 1.030 Final    Protein, UA 05/22/2022 Negative  Negative Final    Glucose, UA  05/22/2022 Negative  Negative Final    Ketones, UA 05/22/2022 Negative  Negative Final    Bilirubin (UA) 05/22/2022 Negative  Negative Final    Occult Blood UA 05/22/2022 Negative  Negative Final    Nitrite, UA 05/22/2022 Negative  Negative Final    Urobilinogen, UA 05/22/2022 Negative  Negative EU/dL Final    Leukocytes, UA 05/22/2022 1+ (A)  Negative Final    SARS-CoV-2 RNA, Amplification, Qual 05/22/2022 Positive (AA)  Negative Final    D-Dimer 05/22/2022 0.48  <0.50 ug/mL FEU Final    POC Preg Test, Ur 05/22/2022 Negative  Negative Final     Acceptable 05/22/2022 Yes   Final    RBC, UA 05/22/2022 1  0 - 4 /hpf Final    WBC, UA 05/22/2022 7 (H)  0 - 5 /hpf Final    Bacteria 05/22/2022 Negative  None-Occ /hpf Final    Squam Epithel, UA 05/22/2022 4  /hpf Final    Hyaline Casts, UA 05/22/2022 4 (A)  0-1/lpf /lpf Final    Microscopic Comment 05/22/2022 SEE COMMENT   Final        Past Medical History:   Diagnosis Date    Back pain     Panic attack      Past Surgical History:   Procedure Laterality Date    breast augmentation      DILATION AND CURETTAGE OF UTERUS      TUBAL LIGATION       History reviewed. No pertinent family history.    Marital Status:   Alcohol History:  reports no history of alcohol use.  Tobacco History:  reports that she has been smoking cigarettes. She has been smoking an average of 1 pack per day. She has never used smokeless tobacco.  Drug History:  reports no history of drug use.    Review of patient's allergies indicates:  No Known Allergies    Current Outpatient Medications:     aspirin-caffeine 1,000-65 mg PwPk, Take by mouth 2 (two) times daily as needed., Disp: , Rfl:     cyclobenzaprine (FLEXERIL) 5 MG tablet, Take 1 tablet (5 mg total) by mouth nightly., Disp: 90 tablet, Rfl: 1    EScitalopram oxalate (LEXAPRO) 10 MG tablet, Take 1 tablet (10 mg total) by mouth once daily., Disp: 90 tablet, Rfl: 1    Review of Systems   Constitutional:  Positive for fatigue.  "Negative for activity change and unexpected weight change.   HENT:  Negative for hearing loss, postnasal drip, sinus pressure, sore throat and voice change.    Eyes:  Negative for photophobia and visual disturbance.   Respiratory:  Negative for cough, shortness of breath and wheezing.    Cardiovascular:  Negative for chest pain and palpitations.   Gastrointestinal:  Negative for constipation, diarrhea and nausea.   Genitourinary:  Negative for difficulty urinating, dysuria, frequency, hematuria, urgency and vaginal discharge.   Musculoskeletal:  Negative for arthralgias and back pain.   Skin:  Negative for rash.   Neurological:  Negative for weakness, light-headedness and headaches.   Hematological:  Negative for adenopathy. Does not bruise/bleed easily.   Psychiatric/Behavioral:  Positive for decreased concentration. The patient is nervous/anxious.         Objective:      Vitals:    10/26/22 1615   BP: 120/74   Pulse: 87   SpO2: 98%   Weight: 82 kg (180 lb 12.8 oz)   Height: 5' 4" (1.626 m)     Physical Exam  Vitals reviewed.   Constitutional:       General: She is not in acute distress.     Appearance: Normal appearance. She is well-developed. She is obese.   HENT:      Head: Normocephalic and atraumatic.      Right Ear: Hearing, tympanic membrane, ear canal and external ear normal.      Left Ear: Hearing, tympanic membrane, ear canal and external ear normal.      Nose: Nose normal. No septal deviation, mucosal edema or rhinorrhea.      Mouth/Throat:      Mouth: Mucous membranes are moist.      Pharynx: Oropharynx is clear. Uvula midline. No posterior oropharyngeal erythema.   Eyes:      General: No scleral icterus.     Conjunctiva/sclera: Conjunctivae normal.      Pupils: Pupils are equal, round, and reactive to light.   Neck:      Thyroid: No thyromegaly.      Vascular: No JVD.   Cardiovascular:      Rate and Rhythm: Normal rate and regular rhythm.      Pulses: Normal pulses.      Heart sounds: Normal heart " sounds. No murmur heard.    No friction rub. No gallop.   Pulmonary:      Effort: Pulmonary effort is normal. No respiratory distress.      Breath sounds: Normal breath sounds. No wheezing.   Chest:      Chest wall: No tenderness.   Abdominal:      General: Bowel sounds are normal. There is no distension.      Palpations: Abdomen is soft. There is no hepatomegaly, splenomegaly or mass.      Tenderness: There is no abdominal tenderness.   Musculoskeletal:         General: No swelling, tenderness or deformity. Normal range of motion.      Cervical back: Normal range of motion and neck supple.   Lymphadenopathy:      Cervical: No cervical adenopathy.   Skin:     General: Skin is warm and dry.      Findings: No erythema or rash.   Neurological:      General: No focal deficit present.      Mental Status: She is alert and oriented to person, place, and time.      Cranial Nerves: No cranial nerve deficit.      Sensory: No sensory deficit.      Coordination: Coordination normal.   Psychiatric:         Mood and Affect: Mood normal. Affect is blunt.         Speech: Speech normal.         Behavior: Behavior normal.         Assessment:       1. JOHNY (generalized anxiety disorder)    2. Decreased libido    3. Sleep disorder    4. Nocturia    5. Nutritional counseling         Plan:       JOHNY (generalized anxiety disorder)  -     EScitalopram oxalate (LEXAPRO) 10 MG tablet; Take 1 tablet (10 mg total) by mouth once daily.  Dispense: 90 tablet; Refill: 1    Decreased libido  Comments:  decreased dose of lexapro to see if this helps symptoms  Orders:  -     EScitalopram oxalate (LEXAPRO) 10 MG tablet; Take 1 tablet (10 mg total) by mouth once daily.  Dispense: 90 tablet; Refill: 1    Sleep disorder  Comments:  continue mirtazapine    Nocturia    Nutritional counseling  Comments:  to help with weight gain and fatigue    Follow up in about 4 months (around 2/26/2023) for mood, sleep, memory.

## 2023-01-12 ENCOUNTER — TELEPHONE (OUTPATIENT)
Dept: OPTOMETRY | Facility: CLINIC | Age: 48
End: 2023-01-12
Payer: COMMERCIAL

## 2023-01-12 NOTE — TELEPHONE ENCOUNTER
Reached out to patient regarding appointment request. Unable to reach patient at cell or home phone. Left detailed vm    Caller is requesting a sooner appointment.  Caller declined first available appointment listed below.  Caller will not accept being placed on the waitlist and is requesting a message be sent to doctor.

## 2023-01-31 ENCOUNTER — PATIENT MESSAGE (OUTPATIENT)
Dept: UROLOGY | Facility: CLINIC | Age: 48
End: 2023-01-31

## 2023-01-31 ENCOUNTER — OFFICE VISIT (OUTPATIENT)
Dept: UROLOGY | Facility: CLINIC | Age: 48
End: 2023-01-31
Payer: COMMERCIAL

## 2023-01-31 VITALS
HEART RATE: 72 BPM | WEIGHT: 185 LBS | DIASTOLIC BLOOD PRESSURE: 86 MMHG | RESPIRATION RATE: 18 BRPM | HEIGHT: 64 IN | BODY MASS INDEX: 31.58 KG/M2 | SYSTOLIC BLOOD PRESSURE: 131 MMHG

## 2023-01-31 DIAGNOSIS — Z87.442 HISTORY OF KIDNEY STONES: ICD-10-CM

## 2023-01-31 DIAGNOSIS — N39.3 SUI (STRESS URINARY INCONTINENCE, FEMALE): Primary | ICD-10-CM

## 2023-01-31 LAB
BILIRUBIN, UA POC OHS: NEGATIVE
BLOOD, UA POC OHS: ABNORMAL
CLARITY, UA POC OHS: CLEAR
COLOR, UA POC OHS: YELLOW
GLUCOSE, UA POC OHS: NEGATIVE
KETONES, UA POC OHS: NEGATIVE
LEUKOCYTES, UA POC OHS: NEGATIVE
NITRITE, UA POC OHS: NEGATIVE
PH, UA POC OHS: 6
PROTEIN, UA POC OHS: NEGATIVE
SPECIFIC GRAVITY, UA POC OHS: 1.02
UROBILINOGEN, UA POC OHS: 1

## 2023-01-31 PROCEDURE — 81003 URINALYSIS AUTO W/O SCOPE: CPT | Mod: QW,S$GLB,, | Performed by: UROLOGY

## 2023-01-31 PROCEDURE — 51741 PR UROFLOWMETRY, COMPLEX: ICD-10-PCS | Mod: S$GLB,,, | Performed by: UROLOGY

## 2023-01-31 PROCEDURE — 3075F PR MOST RECENT SYSTOLIC BLOOD PRESS GE 130-139MM HG: ICD-10-PCS | Mod: CPTII,S$GLB,, | Performed by: UROLOGY

## 2023-01-31 PROCEDURE — 3079F PR MOST RECENT DIASTOLIC BLOOD PRESSURE 80-89 MM HG: ICD-10-PCS | Mod: CPTII,S$GLB,, | Performed by: UROLOGY

## 2023-01-31 PROCEDURE — 1159F PR MEDICATION LIST DOCUMENTED IN MEDICAL RECORD: ICD-10-PCS | Mod: CPTII,S$GLB,, | Performed by: UROLOGY

## 2023-01-31 PROCEDURE — 81003 POCT URINALYSIS(INSTRUMENT): ICD-10-PCS | Mod: QW,S$GLB,, | Performed by: UROLOGY

## 2023-01-31 PROCEDURE — 1160F RVW MEDS BY RX/DR IN RCRD: CPT | Mod: CPTII,S$GLB,, | Performed by: UROLOGY

## 2023-01-31 PROCEDURE — 3008F PR BODY MASS INDEX (BMI) DOCUMENTED: ICD-10-PCS | Mod: CPTII,S$GLB,, | Performed by: UROLOGY

## 2023-01-31 PROCEDURE — 99999 PR PBB SHADOW E&M-EST. PATIENT-LVL III: ICD-10-PCS | Mod: PBBFAC,,, | Performed by: UROLOGY

## 2023-01-31 PROCEDURE — 3008F BODY MASS INDEX DOCD: CPT | Mod: CPTII,S$GLB,, | Performed by: UROLOGY

## 2023-01-31 PROCEDURE — 3075F SYST BP GE 130 - 139MM HG: CPT | Mod: CPTII,S$GLB,, | Performed by: UROLOGY

## 2023-01-31 PROCEDURE — 99204 PR OFFICE/OUTPT VISIT, NEW, LEVL IV, 45-59 MIN: ICD-10-PCS | Mod: S$GLB,,, | Performed by: UROLOGY

## 2023-01-31 PROCEDURE — 1159F MED LIST DOCD IN RCRD: CPT | Mod: CPTII,S$GLB,, | Performed by: UROLOGY

## 2023-01-31 PROCEDURE — 99204 OFFICE O/P NEW MOD 45 MIN: CPT | Mod: S$GLB,,, | Performed by: UROLOGY

## 2023-01-31 PROCEDURE — 99999 PR PBB SHADOW E&M-EST. PATIENT-LVL III: CPT | Mod: PBBFAC,,, | Performed by: UROLOGY

## 2023-01-31 PROCEDURE — 51741 ELECTRO-UROFLOWMETRY FIRST: CPT | Mod: S$GLB,,, | Performed by: UROLOGY

## 2023-01-31 PROCEDURE — 1160F PR REVIEW ALL MEDS BY PRESCRIBER/CLIN PHARMACIST DOCUMENTED: ICD-10-PCS | Mod: CPTII,S$GLB,, | Performed by: UROLOGY

## 2023-01-31 PROCEDURE — 3079F DIAST BP 80-89 MM HG: CPT | Mod: CPTII,S$GLB,, | Performed by: UROLOGY

## 2023-01-31 NOTE — PROGRESS NOTES
[facility] Note - [place]  Staff: MD Mj    Referring provider and please cc: Dr.Chequita Gupta  PCP: Dr.Chequita Gupta    [facility]: active - will sign up today     Chief Complaint:     Subjective:        HPI: Liat Mensah is a 47 y.o. female presents with     Initial consult by me in clinic on 19:  GERMAN, female  She has primary GERMAN that has been present since the birth of her last child 16 years ago and has been worsening since. Very bothersome to her. Changes pantyliners at least 3x a day and voids 30 minutes to 1 hour to keep bladder empty. Significant leakage GERMAN with sneezing, coughing. Denies any prolapse. . Has tried kegels for a long time without success. Not prone to uti's. 8 pound babies. Does not plan to have any more children. Still has regular menses.   Pelvic exam:  neg stress test with coughing supine, neg stress test with valsalva supine  In and out cath performed with 20cc residual - urine was not sent for sample. First sensation to void felt at 40 cc. Significant urge felt at 100cc. Bladder filled to 120 cc  Catheter removed. Neg stress test with coughing supine, neg stress test with valsalva supine. Refilled to 150cc total. Catheter removed +stress test with coughing. Neg  valsalva  Standing    Kidney stones  She has a h/o kidney stone in 2018, went to [facility] for left flank pain. Passed this stone and was told she had another stone (review of ctrss did not show any other stones). This was her first stone. ctrss showed a 3x2mm left uvj stone with mild hydro. No blood in urine since      Interval history 23:  When I last saw her 3 years ago she was not sure if she wanted to proceed with any type of procedure.  Since then she is continued to have significant stress incontinence.  She is wearing 3 thin pads a day still.  She is wet when she changes them.  Continues to leak with coughing, laughing, straining.  Not with intercourse.  Urinates  every hour.  Denies any urge incontinence.  Says Kegel's did not really help.  Here to learn about any other options.  Still working in cafeteria at school.  Ua today with tr blood  No cardiac issues.    Date: 1/31/23 Pelvic exam (stress test/cmg to evaluate stress vs urge incontinence) : SUPINE STRESS/LEAKTEST EMPTY: (--) stress test with cough, (+) small volume stress test with valsalva.  In and out cath with 0 cc residual - urine was not sent for sample. BLADDER FILLING: First sensation to void felt at 50 cc. Significant urge was met (150cc). Bladder filled to 150 cc. The catheter was then removed. SUPINE STRESS/LEAK TEST FULL: (+) stress test with coughing , (--) stress test with valsalva . STANDING STRESS/LEAK TEST FULL: (+) LARGE VOLUMEstress test with coughing.  . Other:: (--) anterior prolapse. (--) atrophic vaginitis. NO urethral caruncle. (+) vaginal discharge (was not sent for vaginosis screen- PT MENSTRUATING)    Urine history: +BC Powder daily, + smoker  1/31/23 Tr bld (menstruating)  5/22/22 1+leuk, 7 wbc/4 sq  2/12/19 Tr wbc  5/2/18  Ng, void: 3+ blood (time of stone)    REVIEW OF SYSTEMS:  General ROS: no fevers, no chills  Psychological ROS: no depression  Endocrine ROS: no heat or cold  Respiratory ROS: no SOB  Cardiovascular ROS: no CP  Gastrointestinal ROS: no abdominal pain, no constipation, no diarrhea, noBRBPR  Musculoskeletal ROS: no muscle pain  Neurological ROS: no headaches  Dermatological ROS: no rashes  HEENT: noglasses, no sinus   ROS: per HPI     PMHx:  Past Medical History:   Diagnosis Date    Back pain     Panic attack    ethel, female  Panic disorder - trauma  Kidney stones: Yes     PSHx:  Past Surgical History:   Procedure Laterality Date    breast augmentation      DILATION AND CURETTAGE OF UTERUS      TUBAL LIGATION     breast reduction  Urologic or Gynecologic Surgery: tubal ligation and d&c     Soc Hx:  + tobacco.  1 pk per day x 25 year  No alcohol  Lives in [place]  : here  with    Children: 2   Occupation:  at [employer]      Objective:     Vitals:    01/31/23 1344   BP: 131/86   Pulse: 72   Resp: 18           Assessment:       Liat Mensah is a 47 y.o. female    1. GERMAN (stress urinary incontinence, female)    2. History of kidney stones            Plan:     She would like to proceed with surgery.  Have not done a CMG since 2019.  She wants to schedule for sometime in June or July.  REPEAT CMG TODAY SHOWS SIGNIFICANT STRESS INCONTINENCE ESPECIALLY with standing.  large volume.  No prolapse.    Recommend altis midurethral sling.     As long as nothing changes we can schedule her for a procedure in early June.  She would need to have the following:  Cath urine 10 days prior for UA and culture  A1c, her glucose on her CMP on 05/22/2022 was slightly elevated  CMP and CBC  Continue to try to stop smoking  Would  plan to do early July (going to [place] June) would plan to do it after she returns in July  Return early June to see me to schedule and review any final questions    Review of CT from 2018 showed no other stones

## 2023-01-31 NOTE — PATIENT INSTRUCTIONS
She would like to proceed with surgery.  Have not done a CMG since 2019.  She wants to schedule for sometime in June or July.  REPEAT CMG TODAY SHOWS SIGNIFICANT STRESS INCONTINENCE ESPECIALLY with standing.  large volume.  No prolapse.    Recommend altis midurethral sling.     As long as nothing changes we can schedule her for a procedure in early June.  She would need to have the following:  Cath urine 10 days prior for UA and culture  A1c, her glucose on her CMP on 05/22/2022 was slightly elevated  CMP and CBC  Continue to try to stop smoking  Would  plan to do early July (going to [place] June) would plan to do it after she returns in July  Return early June to see me to schedule and review any final questions    We discussed the following:  temporary and long term urinary retention requiring a catheter (clean intermittent catheterization) for indefinite amount of time.   Prolapse at a later date resulting in obstruction  If this happened the patient would need cutting or loosening of the sling.   There's a risk of extrusion into the vagina cusing pain in the vagina, risk of erosion into the bladder causing infections or pain, both requiring excision by another surgeon.   There is a risk of pain in the vagina or pelvis.  There's a risk that the incontinence is not completely treated or may require pads.   There is also risk of perforation of the urethra and bladder. However if there is perforation of the urethra during the procedure, this would result in termination of the procedure and placement of catheter for indefinite period of time (usually less than 2 weeks) with treatment of incontinence with a sling at a later date.     I went over the risks and benefits as well as the difference between mesh for prolapse vs mesh for incontinence and the meaning of the FDA warning against mesh for prolapse. Patient was also given a handout regarding risks of mid urethral sling surgery.    Potential  Complications  Potential complications include mesh extrusion, pelvic/urogenital pain, groin pain, hip pain (may be related to patient positioning), urinary retention, bleeding, de cassidy urgency, delayed wound healing, dyspareunia, hip/groin pain, inflammation, nausea, overactive bladder, pain, pelvic hematoma, reaction to antibiotic, slight discomfort upon return to work, urinary tract infection, urine stream decreased, and voiding dysfunction.  Adverse events are known to occur with transvaginal synthetic sling procedures and implants. Adverse events following mesh implantation may be de cassidy, persistent, worsening, transient, or permanent.  Additional potential complications include, but are not limited to, abscess (acute or delayed), adhesion/scar formation, allergy, hypersensitivity or other immune reaction, bleeding, hemorrhage or hematoma, dehiscence, delayed wound healing, extrusion, erosion or exposure of mesh sling into the vagina or other structures or organs, fistula formation, infection, inflammation (acute or chronic), local irritation, necrosis, de cassidy and/or worsening dyspareunia, neuromuscular symptoms (acute or chronic), partner pain and/or discomfort during intercourse, perforation or injury of soft tissue (e.g., muscles, nerves, vessels), structures, or organs (e.g., bone, bladder, urethra, ureters, vagina), seroma, sling migration, suture erosion, bladder storage dysfunction (e.g., increased daytime frequency, urgency, nocturia, overactive bladder, urinary incontinence), ureteral obstruction, urinary tract infection, voiding symptoms (e.g., dysuria, urinary retention, incomplete emptying, straining, positional voiding, weak stream), granulation tissue formation, palpable mesh (patient and/or partner), sexual dysfunction, vaginal discharge (abnormal) and vaginal scarring or tightening.  The occurrence of these events may require one or more revision surgeries, including removal of the  sling.  Complete removal of the sling may not always be possible, and additional surgeries may not always fully correct the complications.  There may be unresolved pain with or without mesh sling explanation.            Post Op Instructions for Midurethral Sling Surgery    Follow ups -see above, confirm you have all the appointments. Call or write if you do not    What to expect in RECOVERY ROOM  If you have a catheter and feel the need to urinate, relax and let the urine flow. You may awaken with a vaginal packing in place.  This will feel like a large tampon.  If you do not have a catheter and feel the need to urinate, please let the recovery room nurse know so they can either assist you to the bathroom or provide you with a bedpan. We will need to make sure you can urinate and empty with a scan prior to leaving.   You may see blue or green urine after surgery.  This is from a dye that is given to you during surgery and will clear within 24 hours.            ACTIVITY with Transobturator Sling  The first six weeks is a critical time period for wound healing.  We depend on the scar tissue to grow into the sling to provide the necessary support to treat the stress urinary incontinence.    After receiving an anesthetic you may feel sleepy or nauseated.  It is best to have little activity for the first 24-48 hours.  Gradually increase activity.  No heavy lifting for 2-3  weeks with a transobturator sling.   Avoid squatting activities for the first 2-3 weeks  Pelvic rest (no sexual intercourse, douching or tampons) for 6 weeks.  It is OK to walk around the house.  Avoid riding a bicycle or putting similar pressure over this area.  No strenuous workouts.    Do not drive for the first 48 hours or if you are taking narcotic pain medication.  Before driving, be sure you can press the brakes without difficulty or pain.    WOUND CARE INSTRUCTIONS  You may have small incisions that were closed with a medical grade superglue or  dissolvable sutures.   You may wash these and pat them dry after 24 hours.  To protect the sling material from infection, do not immerse yourself in water, (i.e. no tub baths, swimming or hot tubbing) for 6 weeks.  You may shower.    Nothing in the vagina for 6 weeks (no intercourse)  It is normal to have some light bleeding which should gradually resolve over the next week.  This will look like a light period.    DIET  In the first 24 hours, it is common to experience some nausea, so take clear liquids.  Once the nausea has resolved, a soft diet is recommended with a gradual increase to your normal diet.    MEDICATIONS  Pain medication should be taken on an as needed basis.  If the narcotic is too much, over-the-counter pain relievers may be taken.  However, do not take additional Tylenol/acetaminophen while taking narcotic pain medication as this can lead to an overdose of the Tylenol/acetaminophen.  Antibiotics, if ordered, should be taken as directed until the prescription has been completed.    A stool softener (Colace, 2 fiber gummies a day or docusate sodium) is recommended to maintain soft stools after surgery.  If you do not have a bowel movement within 36-48 hours of surgery, take 2 tablespoons of Milk of Magnesia.  If there is still no bowel movement by the next day, take ½ bottle of Magnesium Citrate (refrigerate and drink with a straw.  This is available over-the-counter.)    WHEN TO CALL THE DOCTOR:  For heavy bleeding (vaginal discharge like a light period is expected for the 3rd week)  Redness or swelling around the incision.  Fever over 101oF (38.5oC.)  Significant vaginal drainage.   Persistent pain unrelieved by the pain medication.  Leg swelling.  Shortness of breath.  Burning with urination.  Inability to urinate.  Abdominal pain not improving day by day.      FOR EMERGENCIES  If any unusual problems, questions or concerns, please contact your physician or physician on call at [phone] after  hours or during office hours, [phone]       Send a message via [employer] if you have any questions that are not emergent regarding your surgery.

## 2023-03-30 ENCOUNTER — PATIENT OUTREACH (OUTPATIENT)
Dept: ADMINISTRATIVE | Facility: HOSPITAL | Age: 48
End: 2023-03-30
Payer: COMMERCIAL

## 2023-03-30 ENCOUNTER — PATIENT MESSAGE (OUTPATIENT)
Dept: ADMINISTRATIVE | Facility: HOSPITAL | Age: 48
End: 2023-03-30
Payer: COMMERCIAL

## 2023-03-30 NOTE — PROGRESS NOTES
Cervical Cancer Gap Review Report. Care Everywhere updated and reviewed. Media, [facility] and [facility] reviewed. No new HM items found.    Called patient. No answer. Left message along with contact information to please call back regarding overdue HM    Patient portal message sent regarding overdue HM     Immunizations queried, Links failed. Unable to review at this time.

## 2023-04-10 ENCOUNTER — PATIENT OUTREACH (OUTPATIENT)
Dept: ADMINISTRATIVE | Facility: HOSPITAL | Age: 48
End: 2023-04-10
Payer: COMMERCIAL

## 2023-04-10 NOTE — PROGRESS NOTES
"Attempted to outreach patient regarding overdue/ due HM via "[employer]". No response after a week. Now sending outreach via mail out letter.    "

## 2023-04-10 NOTE — LETTER
April 10, 2023    Liat Pedro Juan Antonioadriánbrett  [address]  [facility] [address]             [facility]  [address]  [facility] [address]  Phone: [phone] Dear Ms. Mensah,    Your [facility] is dedicated to helping you stay healthy with regular scheduled recommended screenings.  Scheduling routine screenings is important to maintaining good health. Our records indicate that you may be overdue for your screening pap smear.  Pap smear screening can help identify patients at risk for developing cervical cancer at an early stage, when it is most likely to be successfully treated.    The current recommendation for Pap smear screening is every 3-5 years.  We encourage you to schedule your appointment with your [facility] provider.  Many women see a Gynecologist for this screening but some primary care providers also provide Pap screening.    If you recently had your pap smear screening performed outside of [facility], please let your health care team know so that they can update your health record.     If you have any questions please do not hesitate to call.    Sincerely,    Mohinder Gupta MD and your [facility]  Primary Care Team    Tom LOYD  Clinical Care Coordinator    [facility]  [phone] (Phone)  [phone] (Fax)

## 2023-04-11 ENCOUNTER — PATIENT MESSAGE (OUTPATIENT)
Dept: ADMINISTRATIVE | Facility: HOSPITAL | Age: 48
End: 2023-04-11
Payer: COMMERCIAL

## 2023-05-03 DIAGNOSIS — Z12.31 OTHER SCREENING MAMMOGRAM: ICD-10-CM

## 2023-05-08 ENCOUNTER — OFFICE VISIT (OUTPATIENT)
Dept: FAMILY MEDICINE | Facility: CLINIC | Age: 48
End: 2023-05-08
Attending: FAMILY MEDICINE
Payer: COMMERCIAL

## 2023-05-08 VITALS
BODY MASS INDEX: 33.49 KG/M2 | DIASTOLIC BLOOD PRESSURE: 80 MMHG | HEIGHT: 63 IN | WEIGHT: 189 LBS | HEART RATE: 64 BPM | SYSTOLIC BLOOD PRESSURE: 124 MMHG | OXYGEN SATURATION: 98 %

## 2023-05-08 DIAGNOSIS — M72.2 PLANTAR FASCIITIS OF RIGHT FOOT: ICD-10-CM

## 2023-05-08 DIAGNOSIS — M54.30 SCIATICA, UNSPECIFIED LATERALITY: ICD-10-CM

## 2023-05-08 DIAGNOSIS — F33.41 MAJOR DEPRESSIVE DISORDER, RECURRENT, IN PARTIAL REMISSION: Primary | ICD-10-CM

## 2023-05-08 DIAGNOSIS — R68.82 DECREASED LIBIDO: ICD-10-CM

## 2023-05-08 DIAGNOSIS — F41.8 SITUATIONAL ANXIETY: ICD-10-CM

## 2023-05-08 DIAGNOSIS — Z12.31 OTHER SCREENING MAMMOGRAM: ICD-10-CM

## 2023-05-08 DIAGNOSIS — F41.1 GAD (GENERALIZED ANXIETY DISORDER): ICD-10-CM

## 2023-05-08 PROCEDURE — 1159F PR MEDICATION LIST DOCUMENTED IN MEDICAL RECORD: ICD-10-PCS | Mod: CPTII,S$GLB,, | Performed by: FAMILY MEDICINE

## 2023-05-08 PROCEDURE — 3079F PR MOST RECENT DIASTOLIC BLOOD PRESSURE 80-89 MM HG: ICD-10-PCS | Mod: CPTII,S$GLB,, | Performed by: FAMILY MEDICINE

## 2023-05-08 PROCEDURE — 3079F DIAST BP 80-89 MM HG: CPT | Mod: CPTII,S$GLB,, | Performed by: FAMILY MEDICINE

## 2023-05-08 PROCEDURE — 1160F RVW MEDS BY RX/DR IN RCRD: CPT | Mod: CPTII,S$GLB,, | Performed by: FAMILY MEDICINE

## 2023-05-08 PROCEDURE — 99214 OFFICE O/P EST MOD 30 MIN: CPT | Mod: S$GLB,,, | Performed by: FAMILY MEDICINE

## 2023-05-08 PROCEDURE — 1159F MED LIST DOCD IN RCRD: CPT | Mod: CPTII,S$GLB,, | Performed by: FAMILY MEDICINE

## 2023-05-08 PROCEDURE — 3074F SYST BP LT 130 MM HG: CPT | Mod: CPTII,S$GLB,, | Performed by: FAMILY MEDICINE

## 2023-05-08 PROCEDURE — 3008F BODY MASS INDEX DOCD: CPT | Mod: CPTII,S$GLB,, | Performed by: FAMILY MEDICINE

## 2023-05-08 PROCEDURE — 99214 PR OFFICE/OUTPT VISIT, EST, LEVL IV, 30-39 MIN: ICD-10-PCS | Mod: S$GLB,,, | Performed by: FAMILY MEDICINE

## 2023-05-08 PROCEDURE — 3008F PR BODY MASS INDEX (BMI) DOCUMENTED: ICD-10-PCS | Mod: CPTII,S$GLB,, | Performed by: FAMILY MEDICINE

## 2023-05-08 PROCEDURE — 1160F PR REVIEW ALL MEDS BY PRESCRIBER/CLIN PHARMACIST DOCUMENTED: ICD-10-PCS | Mod: CPTII,S$GLB,, | Performed by: FAMILY MEDICINE

## 2023-05-08 PROCEDURE — 3074F PR MOST RECENT SYSTOLIC BLOOD PRESSURE < 130 MM HG: ICD-10-PCS | Mod: CPTII,S$GLB,, | Performed by: FAMILY MEDICINE

## 2023-05-08 RX ORDER — PROPRANOLOL HYDROCHLORIDE 60 MG/1
60 CAPSULE, EXTENDED RELEASE ORAL NIGHTLY
Qty: 90 CAPSULE | Refills: 1 | Status: SHIPPED | OUTPATIENT
Start: 2023-05-08 | End: 2023-11-09

## 2023-05-08 RX ORDER — SULINDAC 200 MG/1
200 TABLET ORAL 2 TIMES DAILY
Qty: 60 EACH | Refills: 0 | Status: SHIPPED | OUTPATIENT
Start: 2023-05-08 | End: 2023-06-07

## 2023-05-08 RX ORDER — LORAZEPAM 1 MG/1
1 TABLET ORAL DAILY PRN
Qty: 6 TABLET | Refills: 0 | Status: SHIPPED | OUTPATIENT
Start: 2023-05-08 | End: 2023-11-09

## 2023-05-08 RX ORDER — ESCITALOPRAM OXALATE 10 MG/1
10 TABLET ORAL DAILY
Qty: 90 TABLET | Refills: 1 | Status: SHIPPED | OUTPATIENT
Start: 2023-05-08 | End: 2023-11-09 | Stop reason: SDUPTHER

## 2023-05-08 RX ORDER — CYCLOBENZAPRINE HCL 5 MG
5 TABLET ORAL NIGHTLY
Qty: 90 TABLET | Refills: 1 | Status: SHIPPED | OUTPATIENT
Start: 2023-05-08 | End: 2023-11-09 | Stop reason: SDUPTHER

## 2023-05-08 NOTE — PROGRESS NOTES
SUBJECTIVE:    Patient ID: Liat Mensah is a 48 y.o. female.    Chief Complaint: Follow-up (Follow up/ medication refill/ travelling will need something for anxiety/mammo referral//mp)    Patient with longstanding history of major depressive disorder and anxiety presents for follow-up visit.  She was having some issues with decreased libido and this appears to have resolved with decreasing her Lexapro dosage.  Her depressive symptoms are better is well.  However she always feels very anxious and she startles easily.  She has additional concerns that on next month she will be needing to fly to visit her daughter.  She is afraid of this and would like something to help with that anxiety.      She has been having some trouble with back and foot pain.  Notices that if she sits for a long period of time when she gets up to walk there is pain in her arch is in her heels.  Her job does require her to be on her feet for several hours at a time.  Has been having some back discomfort associated with this.        Past Medical History:   Diagnosis Date    Back pain     Panic attack      Past Surgical History:   Procedure Laterality Date    breast augmentation      DILATION AND CURETTAGE OF UTERUS      TUBAL LIGATION       History reviewed. No pertinent family history.    Marital Status:   Alcohol History:  reports no history of alcohol use.  Tobacco History:  reports that she has been smoking cigarettes. She has been smoking an average of 1 pack per day. She has never used smokeless tobacco.  Drug History:  reports no history of drug use.    Review of patient's allergies indicates:  No Known Allergies    Current Outpatient Medications:     aspirin-caffeine 1,000-65 mg PwPk, Take by mouth 2 (two) times daily as needed., Disp: , Rfl:     cyclobenzaprine (FLEXERIL) 5 MG tablet, Take 1 tablet (5 mg total) by mouth nightly., Disp: 90 tablet, Rfl: 1    EScitalopram oxalate (LEXAPRO) 10 MG tablet, Take 1 tablet (10 mg  "total) by mouth once daily., Disp: 90 tablet, Rfl: 1    LORazepam (ATIVAN) 1 MG tablet, Take 1 tablet (1 mg total) by mouth daily as needed for Anxiety., Disp: 6 tablet, Rfl: 0    propranoloL (INDERAL LA) 60 MG 24 hr capsule, Take 1 capsule (60 mg total) by mouth every evening., Disp: 90 capsule, Rfl: 1    sulindac (CLINORIL) 200 MG Tab, Take 1 tablet (200 mg total) by mouth 2 (two) times daily., Disp: 60 each, Rfl: 0    Review of Systems   Constitutional:  Negative for activity change, fatigue and unexpected weight change.   HENT:  Negative for hearing loss, postnasal drip, sinus pressure, sore throat and voice change.    Eyes:  Negative for photophobia and visual disturbance.   Respiratory:  Negative for cough, shortness of breath and wheezing.    Cardiovascular:  Negative for chest pain and palpitations.   Gastrointestinal:  Negative for constipation, diarrhea and nausea.   Genitourinary:  Negative for difficulty urinating, frequency, hematuria and urgency.   Musculoskeletal:  Positive for back pain and gait problem. Negative for arthralgias.   Skin:  Negative for rash.   Neurological:  Negative for weakness, light-headedness and headaches.   Hematological:  Negative for adenopathy. Does not bruise/bleed easily.   Psychiatric/Behavioral:  The patient is nervous/anxious.         Objective:      Vitals:    05/08/23 1211   BP: 124/80   Pulse: 64   SpO2: 98%   Weight: 85.7 kg (189 lb)   Height: 5' 3" (1.6 m)     Physical Exam  Constitutional:       Appearance: Normal appearance.   HENT:      Head: Normocephalic and atraumatic.      Mouth/Throat:      Mouth: Mucous membranes are moist.   Eyes:      Conjunctiva/sclera: Conjunctivae normal.   Pulmonary:      Effort: Pulmonary effort is normal.   Musculoskeletal:      Lumbar back: Spasms and tenderness present.      Right foot: Bony tenderness present.   Neurological:      General: No focal deficit present.      Mental Status: She is alert and oriented to person, place, " and time.   Psychiatric:         Mood and Affect: Mood is anxious.         Behavior: Behavior normal.         Assessment:       1. Major depressive disorder, recurrent, in partial remission    2. JOHNY (generalized anxiety disorder)    3. Situational anxiety    4. Decreased libido    5. Sciatica, unspecified laterality    6. Plantar fasciitis of right foot    7. Other screening mammogram         Plan:       Major depressive disorder, recurrent, in partial remission  -     EScitalopram oxalate (LEXAPRO) 10 MG tablet; Take 1 tablet (10 mg total) by mouth once daily.  Dispense: 90 tablet; Refill: 1    JOHNY (generalized anxiety disorder)  -     EScitalopram oxalate (LEXAPRO) 10 MG tablet; Take 1 tablet (10 mg total) by mouth once daily.  Dispense: 90 tablet; Refill: 1  -     propranoloL (INDERAL LA) 60 MG 24 hr capsule; Take 1 capsule (60 mg total) by mouth every evening.  Dispense: 90 capsule; Refill: 1    Situational anxiety  -     LORazepam (ATIVAN) 1 MG tablet; Take 1 tablet (1 mg total) by mouth daily as needed for Anxiety.  Dispense: 6 tablet; Refill: 0    Decreased libido  Comments:  resolved    Sciatica, unspecified laterality  Comments:  exercises provided  Orders:  -     cyclobenzaprine (FLEXERIL) 5 MG tablet; Take 1 tablet (5 mg total) by mouth nightly.  Dispense: 90 tablet; Refill: 1    Plantar fasciitis of right foot  Comments:  exercises provided. splint recommended  Orders:  -     sulindac (CLINORIL) 200 MG Tab; Take 1 tablet (200 mg total) by mouth 2 (two) times daily.  Dispense: 60 each; Refill: 0    Other screening mammogram      Follow up in about 4 months (around 9/8/2023) for mood.

## 2023-05-10 DIAGNOSIS — Z12.31 OTHER SCREENING MAMMOGRAM: ICD-10-CM

## 2023-05-12 PROBLEM — F33.41 MAJOR DEPRESSIVE DISORDER, RECURRENT, IN PARTIAL REMISSION: Status: ACTIVE | Noted: 2023-05-12

## 2023-11-09 ENCOUNTER — OFFICE VISIT (OUTPATIENT)
Dept: FAMILY MEDICINE | Facility: CLINIC | Age: 48
End: 2023-11-09
Attending: FAMILY MEDICINE
Payer: COMMERCIAL

## 2023-11-09 VITALS
BODY MASS INDEX: 31.71 KG/M2 | WEIGHT: 179 LBS | OXYGEN SATURATION: 99 % | HEART RATE: 68 BPM | SYSTOLIC BLOOD PRESSURE: 114 MMHG | DIASTOLIC BLOOD PRESSURE: 74 MMHG | HEIGHT: 63 IN

## 2023-11-09 DIAGNOSIS — M72.2 PLANTAR FASCIITIS OF RIGHT FOOT: ICD-10-CM

## 2023-11-09 DIAGNOSIS — F33.41 MAJOR DEPRESSIVE DISORDER, RECURRENT, IN PARTIAL REMISSION: Primary | ICD-10-CM

## 2023-11-09 DIAGNOSIS — M54.30 SCIATICA, UNSPECIFIED LATERALITY: ICD-10-CM

## 2023-11-09 DIAGNOSIS — E66.09 CLASS 1 OBESITY DUE TO EXCESS CALORIES WITHOUT SERIOUS COMORBIDITY WITH BODY MASS INDEX (BMI) OF 31.0 TO 31.9 IN ADULT: ICD-10-CM

## 2023-11-09 DIAGNOSIS — F41.1 GAD (GENERALIZED ANXIETY DISORDER): ICD-10-CM

## 2023-11-09 PROCEDURE — 3008F BODY MASS INDEX DOCD: CPT | Mod: CPTII,S$GLB,, | Performed by: FAMILY MEDICINE

## 2023-11-09 PROCEDURE — 3074F SYST BP LT 130 MM HG: CPT | Mod: CPTII,S$GLB,, | Performed by: FAMILY MEDICINE

## 2023-11-09 PROCEDURE — 3078F DIAST BP <80 MM HG: CPT | Mod: CPTII,S$GLB,, | Performed by: FAMILY MEDICINE

## 2023-11-09 PROCEDURE — 99214 OFFICE O/P EST MOD 30 MIN: CPT | Mod: S$GLB,,, | Performed by: FAMILY MEDICINE

## 2023-11-09 RX ORDER — ESCITALOPRAM OXALATE 10 MG/1
10 TABLET ORAL DAILY
Qty: 90 TABLET | Refills: 3 | Status: SHIPPED | OUTPATIENT
Start: 2023-11-09 | End: 2024-05-14 | Stop reason: SDUPTHER

## 2023-11-09 RX ORDER — CYCLOBENZAPRINE HCL 5 MG
5 TABLET ORAL NIGHTLY
Qty: 90 TABLET | Refills: 1 | Status: SHIPPED | OUTPATIENT
Start: 2023-11-09

## 2023-11-09 RX ORDER — SEMAGLUTIDE 0.25 MG/.5ML
0.25 INJECTION, SOLUTION SUBCUTANEOUS
Qty: 2 ML | Refills: 1 | Status: SHIPPED | OUTPATIENT
Start: 2023-11-09 | End: 2024-05-14

## 2023-11-09 NOTE — PROGRESS NOTES
SUBJECTIVE:    Patient ID: Liat Mensah is a 48 y.o. female.    Chief Complaint: Regular Check Up, declined flu vaccine, and declined mammogram order    Patient with history of depressive symptoms presents for routine exam.  She continues to do well on Lexapro.  She declines flu vaccine and mammogram.    Has some concerns about her weight.  Despite making some dietary changes she is having difficulty keeping the weight down.  She is interested in Wegovy to help with her weight.      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  She has noted some pain down her leg and in her lower back standing for too long.noted foot pain as well        Past Medical History:   Diagnosis Date    Back pain     Panic attack      Past Surgical History:   Procedure Laterality Date    breast augmentation      DILATION AND CURETTAGE OF UTERUS      TUBAL LIGATION       History reviewed. No pertinent family history.    Marital Status:   Alcohol History:  reports no history of alcohol use.  Tobacco History:  reports that she has been smoking cigarettes. She has never used smokeless tobacco.  Drug History:  reports no history of drug use.    Review of patient's allergies indicates:  No Known Allergies    Current Outpatient Medications:     aspirin-caffeine 1,000-65 mg PwPk, Take by mouth 2 (two) times daily as needed., Disp: , Rfl:     cyclobenzaprine (FLEXERIL) 5 MG tablet, Take 1 tablet (5 mg total) by mouth nightly., Disp: 90 tablet, Rfl: 1    EScitalopram oxalate (LEXAPRO) 10 MG tablet, Take 1 tablet (10 mg total) by mouth once daily., Disp: 90 tablet, Rfl: 3    semaglutide, weight loss, (WEGOVY) 0.25 mg/0.5 mL PnIj, Inject 0.25 mg into the skin every 7 days., Disp: 2 mL, Rfl: 1    Review of Systems   Constitutional:  Negative for activity change, fatigue and unexpected weight change.   HENT:  Negative for hearing loss, postnasal drip, sinus pressure, sore throat and voice change.    Eyes:   "Negative for photophobia and visual disturbance.   Respiratory:  Negative for cough, shortness of breath and wheezing.    Cardiovascular:  Negative for chest pain and palpitations.   Gastrointestinal:  Negative for constipation, diarrhea and nausea.   Genitourinary:  Negative for difficulty urinating, frequency, hematuria and urgency.   Musculoskeletal:  Positive for arthralgias. Negative for back pain.   Skin:  Negative for rash.   Neurological:  Negative for weakness, light-headedness and headaches.   Hematological:  Negative for adenopathy. Does not bruise/bleed easily.   Psychiatric/Behavioral:  The patient is not nervous/anxious.           Objective:      Vitals:    11/09/23 1629   BP: 114/74   Pulse: 68   SpO2: 99%   Weight: 81.2 kg (179 lb)   Height: 5' 3" (1.6 m)     Physical Exam  Constitutional:       Appearance: Normal appearance.   HENT:      Head: Normocephalic and atraumatic.      Mouth/Throat:      Mouth: Mucous membranes are moist.   Eyes:      Conjunctiva/sclera: Conjunctivae normal.   Pulmonary:      Effort: Pulmonary effort is normal.   Musculoskeletal:      Lumbar back: Spasms present.   Neurological:      General: No focal deficit present.      Mental Status: She is alert and oriented to person, place, and time.   Psychiatric:         Mood and Affect: Mood normal.         Behavior: Behavior normal.           Assessment:       1. Major depressive disorder, recurrent, in partial remission    2. JOHNY (generalized anxiety disorder)    3. Sciatica, unspecified laterality    4. Plantar fasciitis of right foot    5. Class 1 obesity due to excess calories without serious comorbidity with body mass index (BMI) of 31.0 to 31.9 in adult         Plan:       Major depressive disorder, recurrent, in partial remission  -     EScitalopram oxalate (LEXAPRO) 10 MG tablet; Take 1 tablet (10 mg total) by mouth once daily.  Dispense: 90 tablet; Refill: 3    JOHNY (generalized anxiety disorder)  -     EScitalopram " oxalate (LEXAPRO) 10 MG tablet; Take 1 tablet (10 mg total) by mouth once daily.  Dispense: 90 tablet; Refill: 3    Sciatica, unspecified laterality  Comments:  exercises provided  Orders:  -     cyclobenzaprine (FLEXERIL) 5 MG tablet; Take 1 tablet (5 mg total) by mouth nightly.  Dispense: 90 tablet; Refill: 1    Plantar fasciitis of right foot    Class 1 obesity due to excess calories without serious comorbidity with body mass index (BMI) of 31.0 to 31.9 in adult  -     semaglutide, weight loss, (WEGOVY) 0.25 mg/0.5 mL PnIj; Inject 0.25 mg into the skin every 7 days.  Dispense: 2 mL; Refill: 1      Follow up in about 6 months (around 5/9/2024) for mood and weight .

## 2024-05-14 ENCOUNTER — OFFICE VISIT (OUTPATIENT)
Dept: FAMILY MEDICINE | Facility: CLINIC | Age: 49
End: 2024-05-14
Attending: FAMILY MEDICINE
Payer: COMMERCIAL

## 2024-05-14 VITALS
HEART RATE: 74 BPM | DIASTOLIC BLOOD PRESSURE: 80 MMHG | SYSTOLIC BLOOD PRESSURE: 130 MMHG | OXYGEN SATURATION: 99 % | BODY MASS INDEX: 32.96 KG/M2 | HEIGHT: 63 IN | WEIGHT: 186 LBS

## 2024-05-14 DIAGNOSIS — F41.1 GAD (GENERALIZED ANXIETY DISORDER): ICD-10-CM

## 2024-05-14 DIAGNOSIS — F33.41 MAJOR DEPRESSIVE DISORDER, RECURRENT, IN PARTIAL REMISSION: ICD-10-CM

## 2024-05-14 DIAGNOSIS — Z00.00 ANNUAL PHYSICAL EXAM: Primary | ICD-10-CM

## 2024-05-14 DIAGNOSIS — E66.09 CLASS 1 OBESITY DUE TO EXCESS CALORIES WITHOUT SERIOUS COMORBIDITY WITH BODY MASS INDEX (BMI) OF 31.0 TO 31.9 IN ADULT: ICD-10-CM

## 2024-05-14 PROCEDURE — 99396 PREV VISIT EST AGE 40-64: CPT | Mod: S$GLB,,, | Performed by: FAMILY MEDICINE

## 2024-05-14 PROCEDURE — 3079F DIAST BP 80-89 MM HG: CPT | Mod: CPTII,S$GLB,, | Performed by: FAMILY MEDICINE

## 2024-05-14 PROCEDURE — 1159F MED LIST DOCD IN RCRD: CPT | Mod: CPTII,S$GLB,, | Performed by: FAMILY MEDICINE

## 2024-05-14 PROCEDURE — 3075F SYST BP GE 130 - 139MM HG: CPT | Mod: CPTII,S$GLB,, | Performed by: FAMILY MEDICINE

## 2024-05-14 PROCEDURE — 3008F BODY MASS INDEX DOCD: CPT | Mod: CPTII,S$GLB,, | Performed by: FAMILY MEDICINE

## 2024-05-14 RX ORDER — ESCITALOPRAM OXALATE 10 MG/1
10 TABLET ORAL DAILY
Qty: 90 TABLET | Refills: 3 | Status: SHIPPED | OUTPATIENT
Start: 2024-05-14 | End: 2025-05-14

## 2024-05-14 NOTE — PROGRESS NOTES
SUBJECTIVE:   HPI: Liat Mensah  is a 49 y.o. female who presents for annual physical .   6M Check Up    Patient presents for her annual exam.  She has history of dysthymia major depressive disorder.  Continues on Lexapro with benefit.  Last mammogram 2022.  She currently refuses a repeat.  Recently had dental work.  We will schedule Pap smear this summer.  Up-to-date with colon cancer screening.  At this time patient does continue to smoke and has no desire to stop.    Weight is stable.  Blood pressure notably elevated on presentation  .  Improve her repeat.  No history of hypertension.  Is on her feet at work but not physically active routinely.      No visits with results within 6 Month(s) from this visit.   Latest known visit with results is:   Office Visit on 01/31/2023   Component Date Value Ref Range Status    Color, POC UA 01/31/2023 Yellow  Yellow, Straw, Colorless Final    Clarity, POC UA 01/31/2023 Clear  Clear Final    Glucose, POC UA 01/31/2023 Negative  Negative Final    Bilirubin, POC UA 01/31/2023 Negative  Negative Final    Ketones, POC UA 01/31/2023 Negative  Negative Final    Spec Grav POC UA 01/31/2023 1.025  1.005 - 1.030 Final    Blood, POC UA 01/31/2023 Trace-intact (A)  Negative Final    pH, POC UA 01/31/2023 6.0  5.0 - 8.0 Final    Protein, POC UA 01/31/2023 Negative  Negative Final    Urobilinogen, POC UA 01/31/2023 1.0  <=1.0 Final    Nitrite, POC UA 01/31/2023 Negative  Negative Final    WBC, POC UA 01/31/2023 Negative  Negative Final      (Not in a hospital admission)    Review of patient's allergies indicates:  No Known Allergies  Current Outpatient Medications on File Prior to Visit   Medication Sig Dispense Refill    cyclobenzaprine (FLEXERIL) 5 MG tablet Take 1 tablet (5 mg total) by mouth nightly. 90 tablet 1    [DISCONTINUED] EScitalopram oxalate (LEXAPRO) 10 MG tablet Take 1 tablet (10 mg total) by mouth once daily. 90 tablet 3    [DISCONTINUED] aspirin-caffeine 1,000-65  mg PwPk Take by mouth 2 (two) times daily as needed. (Patient not taking: Reported on 5/14/2024)      [DISCONTINUED] semaglutide, weight loss, (WEGOVY) 0.25 mg/0.5 mL PnIj Inject 0.25 mg into the skin every 7 days. (Patient not taking: Reported on 5/14/2024) 2 mL 1     No current facility-administered medications on file prior to visit.     Past Medical History:   Diagnosis Date    Back pain     Panic attack      Past Surgical History:   Procedure Laterality Date    breast augmentation      DILATION AND CURETTAGE OF UTERUS      TUBAL LIGATION       No family history on file.  Social History     Tobacco Use    Smoking status: Every Day     Current packs/day: 1.00     Types: Cigarettes    Smokeless tobacco: Never   Substance Use Topics    Alcohol use: No    Drug use: No      Health Maintenance Topics with due status: Not Due       Topic Last Completion Date    Lipid Panel 11/01/2021    Colorectal Cancer Screening 04/18/2022       There is no immunization history on file for this patient.    Review of Systems   Constitutional:  Negative for activity change, fatigue and unexpected weight change.   HENT:  Negative for hearing loss, postnasal drip, sinus pressure, sore throat and voice change.    Eyes:  Negative for photophobia and visual disturbance.   Respiratory:  Negative for cough, shortness of breath and wheezing.    Cardiovascular:  Negative for chest pain and palpitations.   Gastrointestinal:  Negative for constipation, diarrhea and nausea.   Genitourinary:  Negative for difficulty urinating, frequency, hematuria and urgency.   Musculoskeletal:  Negative for arthralgias and back pain.   Skin:  Negative for rash.   Neurological:  Negative for weakness, light-headedness and headaches.   Hematological:  Negative for adenopathy. Does not bruise/bleed easily.   Psychiatric/Behavioral:  The patient is not nervous/anxious.       OBJECTIVE:          11/9/2023     4:29 PM 5/14/2024     3:41 PM   Vitals - 1 value per visit  "  SYSTOLIC 114 164   DIASTOLIC 74 90   Pulse 68 74   SPO2 99 % 99 %   Weight (lb) 179 186   Weight (kg) 81.194 84.369   Height 5' 3" (1.6 m) 5' 3" (1.6 m)   BMI (Calculated) 31.7 33      Physical Exam  Constitutional:       Appearance: Normal appearance.   HENT:      Head: Normocephalic and atraumatic.      Mouth/Throat:      Mouth: Mucous membranes are moist.   Eyes:      Conjunctiva/sclera: Conjunctivae normal.   Pulmonary:      Effort: Pulmonary effort is normal.   Neurological:      General: No focal deficit present.      Mental Status: She is alert and oriented to person, place, and time.   Psychiatric:         Mood and Affect: Mood normal.         Behavior: Behavior normal.        Assessment:       1. Annual physical exam    2. Major depressive disorder, recurrent, in partial remission    3. JOHNY (generalized anxiety disorder)    4. Class 1 obesity due to excess calories without serious comorbidity with body mass index (BMI) of 31.0 to 31.9 in adult        Plan:       Annual physical exam    Major depressive disorder, recurrent, in partial remission  -     EScitalopram oxalate (LEXAPRO) 10 MG tablet; Take 1 tablet (10 mg total) by mouth once daily.  Dispense: 90 tablet; Refill: 3    JOHNY (generalized anxiety disorder)  -     EScitalopram oxalate (LEXAPRO) 10 MG tablet; Take 1 tablet (10 mg total) by mouth once daily.  Dispense: 90 tablet; Refill: 3    Class 1 obesity due to excess calories without serious comorbidity with body mass index (BMI) of 31.0 to 31.9 in adult        Counseled on age and gender appropriate medical preventative services, including cancer screenings, immunizations, overall nutritional health, need for a consistent exercise regimen and an overall push towards maintaining a vigorous and active lifestyle.      Follow up in about 1 year (around 5/14/2025) for Annual Physical.              "

## 2024-08-07 ENCOUNTER — PATIENT MESSAGE (OUTPATIENT)
Dept: ADMINISTRATIVE | Facility: HOSPITAL | Age: 49
End: 2024-08-07
Payer: COMMERCIAL

## 2025-01-29 ENCOUNTER — OFFICE VISIT (OUTPATIENT)
Dept: FAMILY MEDICINE | Facility: CLINIC | Age: 50
End: 2025-01-29
Payer: COMMERCIAL

## 2025-01-29 ENCOUNTER — TELEPHONE (OUTPATIENT)
Dept: FAMILY MEDICINE | Facility: CLINIC | Age: 50
End: 2025-01-29

## 2025-01-29 VITALS
BODY MASS INDEX: 33.31 KG/M2 | HEIGHT: 63 IN | OXYGEN SATURATION: 98 % | DIASTOLIC BLOOD PRESSURE: 80 MMHG | SYSTOLIC BLOOD PRESSURE: 160 MMHG | HEART RATE: 79 BPM | WEIGHT: 188 LBS

## 2025-01-29 DIAGNOSIS — R30.0 DYSURIA: ICD-10-CM

## 2025-01-29 DIAGNOSIS — R10.9 FLANK PAIN: Primary | ICD-10-CM

## 2025-01-29 PROBLEM — F17.200 NICOTINE DEPENDENCE: Status: ACTIVE | Noted: 2025-01-29

## 2025-01-29 LAB
BILIRUBIN, UA POC OHS: NEGATIVE
BLOOD, UA POC OHS: NEGATIVE
CLARITY, UA POC OHS: CLEAR
COLOR, UA POC OHS: YELLOW
GLUCOSE, UA POC OHS: NEGATIVE
KETONES, UA POC OHS: ABNORMAL
LEUKOCYTES, UA POC OHS: NEGATIVE
NITRITE, UA POC OHS: NEGATIVE
PH, UA POC OHS: 5.5
PROTEIN, UA POC OHS: NEGATIVE
SPECIFIC GRAVITY, UA POC OHS: >=1.03
UROBILINOGEN, UA POC OHS: 0.2

## 2025-01-29 PROCEDURE — 1160F RVW MEDS BY RX/DR IN RCRD: CPT | Mod: CPTII,S$GLB,,

## 2025-01-29 PROCEDURE — 3079F DIAST BP 80-89 MM HG: CPT | Mod: CPTII,S$GLB,,

## 2025-01-29 PROCEDURE — 81003 URINALYSIS AUTO W/O SCOPE: CPT | Mod: QW,S$GLB,,

## 2025-01-29 PROCEDURE — 99213 OFFICE O/P EST LOW 20 MIN: CPT | Mod: S$GLB,,,

## 2025-01-29 PROCEDURE — 3077F SYST BP >= 140 MM HG: CPT | Mod: CPTII,S$GLB,,

## 2025-01-29 PROCEDURE — 3008F BODY MASS INDEX DOCD: CPT | Mod: CPTII,S$GLB,,

## 2025-01-29 PROCEDURE — 1159F MED LIST DOCD IN RCRD: CPT | Mod: CPTII,S$GLB,,

## 2025-01-29 RX ORDER — KETOROLAC TROMETHAMINE 10 MG/1
10 TABLET, FILM COATED ORAL EVERY 6 HOURS PRN
Qty: 20 TABLET | Refills: 0 | Status: SHIPPED | OUTPATIENT
Start: 2025-01-29 | End: 2025-02-03

## 2025-01-29 RX ORDER — TAMSULOSIN HYDROCHLORIDE 0.4 MG/1
0.8 CAPSULE ORAL DAILY
Qty: 60 CAPSULE | Refills: 11 | Status: SHIPPED | OUTPATIENT
Start: 2025-01-29 | End: 2025-01-29

## 2025-01-29 RX ORDER — HYDROCODONE BITARTRATE AND ACETAMINOPHEN 5; 325 MG/1; MG/1
1 TABLET ORAL EVERY 6 HOURS PRN
Qty: 28 TABLET | Refills: 0 | Status: SHIPPED | OUTPATIENT
Start: 2025-01-29 | End: 2025-02-05

## 2025-01-29 RX ORDER — TAMSULOSIN HYDROCHLORIDE 0.4 MG/1
0.8 CAPSULE ORAL DAILY
Qty: 60 CAPSULE | Refills: 11 | Status: SHIPPED | OUTPATIENT
Start: 2025-01-29 | End: 2026-01-29

## 2025-01-29 RX ORDER — PHENAZOPYRIDINE HYDROCHLORIDE 200 MG/1
200 TABLET, FILM COATED ORAL 3 TIMES DAILY PRN
Qty: 9 TABLET | Refills: 0 | Status: SHIPPED | OUTPATIENT
Start: 2025-01-29 | End: 2025-01-29

## 2025-01-29 RX ORDER — PHENAZOPYRIDINE HYDROCHLORIDE 200 MG/1
200 TABLET, FILM COATED ORAL 3 TIMES DAILY PRN
Qty: 9 TABLET | Refills: 0 | Status: SHIPPED | OUTPATIENT
Start: 2025-01-29 | End: 2025-02-01

## 2025-01-29 RX ORDER — HYDROCODONE BITARTRATE AND ACETAMINOPHEN 5; 325 MG/1; MG/1
1 TABLET ORAL EVERY 6 HOURS PRN
Qty: 28 TABLET | Refills: 0 | Status: SHIPPED | OUTPATIENT
Start: 2025-01-29 | End: 2025-01-29

## 2025-01-29 NOTE — TELEPHONE ENCOUNTER
----- Message from Binta sent at 1/29/2025 11:43 AM CST -----  The patient has been having RT side stomach and back pain for 9 days.  She has had Kidney Stones before and that's what it feels like.  Pt's # [phone]

## 2025-02-03 NOTE — PROGRESS NOTES
SUBJECTIVE:    Patient ID: Liat Mensah is a 49 y.o. female.    Chief Complaint: Back Pain and Flank Pain (NO bottles//Pt c/o back and flank pain on right side x 9 days. States it feels like a kidney stone. Denies any injury. Denies any urinary issues. Taking BC arthritis,  before she left work. Her stomach hurts from the IBU//declines mammo and colo//JL)    HPI  History of Present Illness    CHIEF COMPLAINT:  Liat presents today with flank pain for the past 9-10 days.    HISTORY OF PRESENT ILLNESS:  She reports flank pain extending past hip region for past 9-10 days. The pain is continuous and she is unable to find a comfortable position. She describes the pain as similar to previous kidney stone episode but less severe in intensity.    MEDICAL HISTORY:  She has a history of kidney stones with previous episode characterized by severe symptoms including vomiting. She also has a history of sciatic pain.    MEDICATIONS:  She takes BC powder daily but reports limited intake due to stomach irritation. She has a prescription for Flexeril. She is unable to take ibuprofen or naproxen due to stomach issues.    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      ROS:  General: -fever, -chills, -fatigue, -weight gain, -weight loss  Eyes: -vision changes, -redness, -discharge  ENT: -ear pain, -nasal congestion, -sore throat  Cardiovascular: -chest pain, -palpitations, -lower extremity edema  Respiratory: -cough, -shortness of breath  Gastrointestinal: -abdominal pain, -nausea, -vomiting, -diarrhea, -constipation, -blood in stool  Genitourinary: -dysuria, -hematuria, -frequency  Musculoskeletal: -joint pain, +muscle pain  Skin: -rash, -lesion  Neurological: -headache, -dizziness, -numbness, -tingling  Psychiatric: -anxiety, -depression, -sleep difficulty         Office Visit on 01/29/2025   Component Date Value Ref Range  Status    Color, POC UA 01/29/2025 Yellow  Yellow, Straw, Colorless Final    Clarity, POC UA 01/29/2025 Clear  Clear Final    Glucose, POC UA 01/29/2025 Negative  Negative Final    Bilirubin, POC UA 01/29/2025 Negative  Negative Final    Ketones, POC UA 01/29/2025 Trace (A)  Negative Final    Spec Grav POC UA 01/29/2025 >=1.030  1.005 - 1.030 Final    Blood, POC UA 01/29/2025 Negative  Negative Final    pH, POC UA 01/29/2025 5.5  5.0 - 8.0 Final    Protein, POC UA 01/29/2025 Negative  Negative Final    Urobilinogen, POC UA 01/29/2025 0.2  <=1.0 Final    Nitrite, POC UA 01/29/2025 Negative  Negative Final    WBC, POC UA 01/29/2025 Negative  Negative Final       Past Medical History:   Diagnosis Date    Back pain     Panic attack      Social History     Socioeconomic History    Marital status:    Tobacco Use    Smoking status: Every Day     Current packs/day: 1.00     Types: Cigarettes    Smokeless tobacco: Never   Substance and Sexual Activity    Alcohol use: No    Drug use: No     Past Surgical History:   Procedure Laterality Date    breast augmentation      DILATION AND CURETTAGE OF UTERUS      TUBAL LIGATION       No family history on file.    The CVD Risk score (D'Agostino, et al., 2008) failed to calculate for the following reasons:    Cannot find a previous HDL lab    Cannot find a previous total cholesterol lab    Tests to Keep You Healthy    Mammogram: ORDERED BUT NOT SCHEDULED  Colon Cancer Screening: Met on 4/18/2022  Cervical Cancer Screening: DUE  Tobacco Cessation: NO      Review of patient's allergies indicates:  No Known Allergies    Current Outpatient Medications:     aspirin/salicylamide/caffeine (ARTHRITIS STRENGTH BC POWDER ORAL), Take by mouth., Disp: , Rfl:     EScitalopram oxalate (LEXAPRO) 10 MG tablet, Take 1 tablet (10 mg total) by mouth once daily., Disp: 90 tablet, Rfl: 3    cyclobenzaprine (FLEXERIL) 5 MG tablet, Take 1 tablet (5 mg total) by mouth nightly. (Patient not taking:  "Reported on 1/29/2025), Disp: 90 tablet, Rfl: 1    HYDROcodone-acetaminophen (NORCO) 5-325 mg per tablet, Take 1 tablet by mouth every 6 (six) hours as needed for Pain., Disp: 28 tablet, Rfl: 0    ketorolac (TORADOL) 10 mg tablet, Take 1 tablet (10 mg total) by mouth every 6 (six) hours as needed for Pain., Disp: 20 tablet, Rfl: 0    tamsulosin (FLOMAX) 0.4 mg Cap, Take 2 capsules (0.8 mg total) by mouth once daily., Disp: 60 capsule, Rfl: 11    Review of Systems        Objective:      Vitals:    01/29/25 1631   BP: (!) 160/80   Pulse: 79   SpO2: 98%   Weight: 85.3 kg (188 lb)   Height: 5' 3" (1.6 m)     Physical Exam  Physical Exam    Constitutional: In no acute distress. Appears uncomfortable.  Cardiovascular: Normal rate and regular rhythm.  Pulmonary: Pulmonary effort is normal. Normal breath sounds.  Abdomen: Bowel sounds are normal. Abdomen is soft. No abdominal tenderness. No CVA tenderness.  Musculoskeletal: Normal range of motion at all joints. Normal range of motion of the cervical back.  No lower extremity edema bilaterally.  Skin: Warm. Dry. Capillary refill takes less than 2 seconds.            Assessment:       1. Flank pain    2. Dysuria         Plan:       Flank pain  -     HYDROcodone-acetaminophen (NORCO) 5-325 mg per tablet; Take 1 tablet by mouth every 6 (six) hours as needed for Pain.  Dispense: 28 tablet; Refill: 0  -     tamsulosin (FLOMAX) 0.4 mg Cap; Take 2 capsules (0.8 mg total) by mouth once daily.  Dispense: 60 capsule; Refill: 11  -     ketorolac (TORADOL) 10 mg tablet; Take 1 tablet (10 mg total) by mouth every 6 (six) hours as needed for Pain.  Dispense: 20 tablet; Refill: 0  -     POCT Urinalysis(Instrument)    Dysuria  -     phenazopyridine (PYRIDIUM) 200 MG tablet; Take 1 tablet (200 mg total) by mouth 3 (three) times daily as needed for Pain.  Dispense: 9 tablet; Refill: 0  -     POCT Urinalysis(Instrument)          Assessment & Plan    IMPRESSION:  - Suspected kidney stone based " on patient's history and symptoms  - Considered differential diagnoses including muscular strain, sciatic pain, and constipation  - Opted for conservative management initially, deferring CT due to patient preference and absence of severe symptoms  - Recommend hydration and pain management to facilitate potential stone passage  - Prescribed medications to aid in stone passage and manage pain, considering patient's gastric sensitivity    SUSPECTED KIDNEY STONE:  - Assessed the patient's symptoms and history, suspecting a kidney stone based on reported pain in the side for 9-10 days, similar to previous kidney stone experience but less severe..  - Described the process of kidney stone passage and associated symptoms.  - Performed urinalysis in office, noting no blood in urine.  - Prescribed Flomax (tamsulosin) to help dilate the ureter and facilitate stone passage.  - Prescribed hydrocodone 5mg/325mg for severe pain, instructing not to take before work.  - Prescribed Toradol (ketorolac) as an alternative NSAID for pain relief, cautioning about potential gastric effects.  - Prescribed Pyridium (phenazopyridine) for urinary discomfort if needed.  - Advised taking Tylenol (acetaminophen) between hydrocodone doses if needed, cautioning not to exceed recommended daily limit.  - Recommend increased water intake and adding lemon to water.  - Suggested applying heat to the affected area for pain relief.  - Considered patient's history of kidney stones in current diagnosis and treatment plan.  - Discussed the role of hydration in kidney stone management.  - Educated on the effects of certain beverages (e.g., iced tea, Diet Coke) on kidney stone formation.  - Advised on dietary changes to prevent future kidney stones, including avoiding iced tea and increasing water intake.  - Liat to increase water intake significantly.  - Liat to avoid iced tea and limit other beverages that may contribute to kidney stone  formation.    POSSIBLE SCIATICA:  - Examined the patient and explained sciatic nerve path, differentiating between kidney and sciatic pain.  - Noted patient's report of pain going down past hip, possibly related to sciatic nerve.  - Suggested gabapentin for nerve pain if it's sciatic-related.  - Recommend applying heat to the affected area for pain relief.    POSSIBLE MUSCLE STRAIN:  - Considered possibility of muscle strain as an alternative diagnosis.    SUSPECTED GASTRITIS/ULCER:  - Noted patient's report of abdominal pain and chest pain after taking ibuprofen.  - Suspected gastritis or beginning of ulcer due to patient's reaction to anti-inflammatory medications.  - Prescribed Prilosec (omeprazole) for gastric protection, to be taken for the next few days.    FOLLOW UP:  - Instructed the patient to contact the office the following day to report on symptoms and treatment progress.  - Advised to contact the office if symptoms worsen or if the patient decides to proceed with CT.         Follow up if symptoms worsen or fail to improve.        This note was generated with the assistance of ambient listening technology. Verbal consent was obtained by the patient and accompanying visitor(s) for the recording of patient appointment to facilitate this note. I attest to having reviewed and edited the generated note for accuracy, though some syntax or spelling errors may persist. Please contact the author of this note for any clarification.      2/2/2025 Liya Lorenz

## 2025-02-04 ENCOUNTER — TELEPHONE (OUTPATIENT)
Dept: FAMILY MEDICINE | Facility: CLINIC | Age: 50
End: 2025-02-04
Payer: COMMERCIAL

## 2025-02-04 ENCOUNTER — HOSPITAL ENCOUNTER (OUTPATIENT)
Dept: RADIOLOGY | Facility: HOSPITAL | Age: 50
Discharge: HOME | End: 2025-02-04
Payer: COMMERCIAL

## 2025-02-04 DIAGNOSIS — R10.9 ABDOMINAL PAIN, UNSPECIFIED ABDOMINAL LOCATION: ICD-10-CM

## 2025-02-04 DIAGNOSIS — R30.0 DYSURIA: ICD-10-CM

## 2025-02-04 DIAGNOSIS — R10.9 FLANK PAIN: ICD-10-CM

## 2025-02-04 DIAGNOSIS — M54.9 ACUTE BACK PAIN, UNSPECIFIED BACK LOCATION, UNSPECIFIED BACK PAIN LATERALITY: Primary | ICD-10-CM

## 2025-02-04 DIAGNOSIS — R10.9 FLANK PAIN: Primary | ICD-10-CM

## 2025-02-04 PROCEDURE — 74176 CT ABD & PELVIS W/O CONTRAST: CPT | Mod: 26,,, | Performed by: RADIOLOGY

## 2025-02-04 PROCEDURE — 74176 CT ABD & PELVIS W/O CONTRAST: CPT | Mod: TC

## 2025-02-04 RX ORDER — METHYLPREDNISOLONE 4 MG/1
TABLET ORAL
Qty: 21 EACH | Refills: 0 | Status: SHIPPED | OUTPATIENT
Start: 2025-02-04 | End: 2025-02-25

## 2025-02-04 RX ORDER — METHOCARBAMOL 750 MG/1
750 TABLET, FILM COATED ORAL 3 TIMES DAILY PRN
Qty: 30 TABLET | Refills: 0 | Status: SHIPPED | OUTPATIENT
Start: 2025-02-04 | End: 2025-02-14

## 2025-02-04 NOTE — TELEPHONE ENCOUNTER
Pt reports still having side and back pain. States pain is steady. Not any worse, but today at work she was getting strong pains all of a sudden states she just needs to see if its maybe a kidney stone. Requesting CT   Detail Level: Zone Photo Preface (Leave Blank If You Do Not Want): Photographs were obtained today

## 2025-02-04 NOTE — TELEPHONE ENCOUNTER
----- Message from Binta sent at 2/4/2025 10:06 AM CST -----  The patient saw Liya and she said if the patient was not any better. She would set up a CT Scan. Please call pt's # [phone] GH

## 2025-02-04 NOTE — TELEPHONE ENCOUNTER
Spoke with scheduling. CT scheduled today at 5:30. Pt notified to go through ER if arrives at 5 pm or later. Pt voiced understanding.

## 2025-02-05 ENCOUNTER — TELEPHONE (OUTPATIENT)
Dept: FAMILY MEDICINE | Facility: CLINIC | Age: 50
End: 2025-02-05
Payer: COMMERCIAL

## 2025-02-05 NOTE — TELEPHONE ENCOUNTER
----- Message from Andrea Perales sent at 2/5/2025  9:30 AM CST -----    ----- Message -----  From: Binta Ibarra  Sent: 2/5/2025   9:28 AM CST  To: Mohinder Gupta Staff    The patient is returning Nieves's call pt's # [phone] GH

## 2025-02-05 NOTE — PROGRESS NOTES
Absolutely no abnormal findings on CT to explain patient's symptoms or pain. Pain could be musculoskeletal. She can stop the Flomax. Discontinue Flexeril. I will send her a steroid pack and a different muscle relaxer.

## 2025-02-05 NOTE — TELEPHONE ENCOUNTER
----- Message from SATYA Phillips sent at 2/4/2025  6:07 PM CST -----  Absolutely no abnormal findings on CT to explain patient's symptoms or pain. Pain could be musculoskeletal. She can stop the Flomax. Discontinue Flexeril. I will send her a steroid pack and a different muscle relaxer.

## 2025-04-22 DIAGNOSIS — M54.30 SCIATICA, UNSPECIFIED LATERALITY: ICD-10-CM

## 2025-04-22 RX ORDER — CYCLOBENZAPRINE HCL 5 MG
5 TABLET ORAL NIGHTLY
Qty: 90 TABLET | Refills: 1 | Status: SHIPPED | OUTPATIENT
Start: 2025-04-22

## 2025-04-22 NOTE — TELEPHONE ENCOUNTER
prescription sent to   [facility] - [facility] - [address]  [address] [place] [address]  Phone: [phone] Fax: [phone]

## 2025-04-24 ENCOUNTER — TELEPHONE (OUTPATIENT)
Dept: UROLOGY | Facility: CLINIC | Age: 50
End: 2025-04-24
Payer: COMMERCIAL

## 2025-04-24 NOTE — TELEPHONE ENCOUNTER
Spoke with patient offered appointment on 6/17 to discuss procedure patient declined. Will try to find another urologist

## 2025-04-24 NOTE — TELEPHONE ENCOUNTER
Spoke patient informed her will need to schedule appointment to discuss bladder options. Patient verbally voiced understanding.

## 2025-04-24 NOTE — TELEPHONE ENCOUNTER
----- Message from Jessica sent at 4/24/2025  3:26 PM CDT -----  Type:  Appointment RequestCaller is requesting a appointment. Name of Caller:pt When is the first available appointment? not seen Would the patient rather a call back or a response via [facility]? Please call or message Best Call Back Number:[phone] Additional Information: pt was seeing doctor and they spoke of doing a sleeve.  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 Thanks!

## 2025-04-24 NOTE — TELEPHONE ENCOUNTER
----- Message from Sara sent at 4/24/2025 11:17 AM CDT -----  Type:  Needs Medical Advice Who Called: Pt   Would the patient rather a call back or a response via [facility]? Call back Best Call Back Number: [phone] Additional Information: Pt would like a call back from Dr. Barker to discuss a bladder sling procedure. She has had all the testing and labs run in already     Please call Back to advise. Thanks!

## 2025-05-09 ENCOUNTER — TELEPHONE (OUTPATIENT)
Dept: UROLOGY | Facility: CLINIC | Age: 50
End: 2025-05-09
Payer: COMMERCIAL

## 2025-05-09 NOTE — TELEPHONE ENCOUNTER
Urology Telephone Encounter:    Please contact pt and advise of the following:    Per last Telephone encounter notes:  Pt would like to speak with Urologist to discuss bladder sling procedure and options.  Pt was already informed that she would need to make an appointment with Dr. Liya Barker to discuss her surgical options.    Pt declined and made her own appointment with nurse practitioner in June.      *Please inform pt that she incorrectly booked appointment and that the nurse practitioner does not do or discuss bladder sling procedures she would need to see a Urologist.    If she does not want to return to [place], then she would need to see Dr. Andres in [place].    Please cancel appt. With Rachell Dos Santos NP.  Thanks.

## 2025-06-04 DIAGNOSIS — Z12.31 OTHER SCREENING MAMMOGRAM: ICD-10-CM

## 2025-06-09 ENCOUNTER — PATIENT MESSAGE (OUTPATIENT)
Dept: ADMINISTRATIVE | Facility: HOSPITAL | Age: 50
End: 2025-06-09
Payer: COMMERCIAL

## 2025-07-30 DIAGNOSIS — F33.41 MAJOR DEPRESSIVE DISORDER, RECURRENT, IN PARTIAL REMISSION: ICD-10-CM

## 2025-07-30 DIAGNOSIS — F41.1 GAD (GENERALIZED ANXIETY DISORDER): ICD-10-CM

## 2025-07-30 RX ORDER — ESCITALOPRAM OXALATE 10 MG/1
10 TABLET ORAL DAILY
Qty: 90 TABLET | Refills: 0 | Status: SHIPPED | OUTPATIENT
Start: 2025-07-30 | End: 2026-07-30

## 2025-07-31 ENCOUNTER — PATIENT MESSAGE (OUTPATIENT)
Dept: FAMILY MEDICINE | Facility: CLINIC | Age: 50
End: 2025-07-31
Payer: COMMERCIAL